# Patient Record
Sex: MALE | Race: WHITE | Employment: OTHER | URBAN - METROPOLITAN AREA
[De-identification: names, ages, dates, MRNs, and addresses within clinical notes are randomized per-mention and may not be internally consistent; named-entity substitution may affect disease eponyms.]

---

## 2017-01-09 DIAGNOSIS — E78.5 HLD (HYPERLIPIDEMIA): Primary | ICD-10-CM

## 2017-01-09 RX ORDER — ATORVASTATIN CALCIUM 40 MG/1
40 TABLET, FILM COATED ORAL DAILY
Qty: 90 TABLET | Refills: 3 | Status: SHIPPED | OUTPATIENT
Start: 2017-01-09 | End: 2018-01-15

## 2017-06-23 ENCOUNTER — HOSPITAL ENCOUNTER (EMERGENCY)
Facility: CLINIC | Age: 82
Discharge: HOME | End: 2017-06-24
Attending: EMERGENCY MEDICINE | Admitting: EMERGENCY MEDICINE
Payer: MEDICARE

## 2017-06-23 ENCOUNTER — APPOINTMENT (OUTPATIENT)
Dept: GENERAL RADIOLOGY | Facility: CLINIC | Age: 82
End: 2017-06-23
Attending: EMERGENCY MEDICINE
Payer: MEDICARE

## 2017-06-23 DIAGNOSIS — F10.920 ALCOHOL INTOXICATION, UNCOMPLICATED (H): ICD-10-CM
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
## 2017-06-23 PROCEDURE — 99285 EMERGENCY DEPT VISIT HI MDM: CPT | Mod: 25

## 2017-06-23 PROCEDURE — 71020 XR CHEST 2 VW: CPT

## 2017-06-23 NOTE — ED AVS SNAPSHOT
Emergency Department    [address] [facility] [address]    Phone:  [phone]    Fax:  [phone]                                       Du Travis   MRN: [id number]    Department:   Emergency Department   Date of Visit:  6/23/2017           Patient Information     Date Of Birth          1935        Your diagnoses for this visit were:     Alcohol intoxication, uncomplicated (H)     Accident due to mechanical fall without injury, initial encounter        You were seen by Mei Guadarrama MD.      Follow-up Information     Follow up with [facility]    Why:  As needed    Contact information:    [address] [address] [phone]          Follow up with  Emergency Department.    Specialty:  EMERGENCY MEDICINE    Why:  As needed    Contact information:    [address] [facility] [address] [phone]        Discharge Instructions         Fall Due to Dizziness, Weakness, or Loss of Balance  The symptoms that led to your fall have been evaluated. Your doctor feels it is safe for you to return home.  Many things can cause you to become dizzy. Everyone means a little something different by the word dizzy. People may describe their symptoms using these words:    It doesn t feel right in my head    It feels like spinning in my head    It seems like the room is spinning    My balance feels off    I feel lightheaded, like I am going to pass out  All these descriptions can have real causes.     Your balance mechanism is in your inner ear. Anything disturbing it can make you feel dizzy, whether it is from a cold, an injury, or many other things. Anything that causes your blood pressure to drop suddenly can make you feel lightheaded, or like you are going to faint. This is because at that moment there might not be enough blood flowing to your brain. Causes include:    Medicines    Dehydration    Standing up or bending over  too quickly    Becoming overheated    Taking a hot shower or bath    Straining hard while lifting something or using the toilet    Strokes, heart attack, heart valve disease, very slow or very fast heart rate    Low blood sugar    Ear infection    Hyperventilation    Anemia    Trauma    Infection    Panic attack    Pregnancy  You may be at risk of repeat falls. Take precautions described below to prevent another fall.  Home care    If you become lightheaded or dizzy, lie down immediately or sit and lean forward with your head down. It is better to do this than fall and seriously hurt or injure yourself.    Rest today. When changing position, take a moment to be sure any dizziness goes away before standing and walking.    If you have been prescribed a walker, be sure to use it whenever you walk, even if it is a short distance.    If you were injured during the fall, follow the advice from your doctor regarding care of your injury.    Be sure your doctor knows all the medicines, herbs, and supplements you take. Some medicines can cause dizziness.  Follow-up care  Unless given other advice, call your doctor on the next office day to advise of your fall and to schedule an appointment. You may require further treatment for the underlying condition that caused today s fall.  If X-ray or a CT scan were done, you will be notified of the results, especially if it affects treatment.  Call 911  Call 911 if any of these occur:    Trouble breathing    Confused or difficulty arousing    Fainting or loss of consciousness    Rapid or very slow heart rate    Seizure    Difficulty with speech or vision, weakness of an arm or leg    Difficulty walking or talking, loss of balance    Numbness or weakness in one side of your body, facial droop  When to seek medical advice  Call your healthcare provider right away if any of the following occur:    Another fall    Continued dizzy spells    Severe headache    Blood in vomit or stools (black  or red color)  Date Last Reviewed: 11/5/2015 2000-2017 [employer]. [address] [place] [address], [place] [address]. All rights reserved. This information is not intended as a substitute for professional medical care. Always follow your healthcare professional's instructions.          24 Hour Appointment Hotline       To make an appointment at any [facility], call [phone] ([phone]). If you don't have a family doctor or clinic, we will help you find one. [facility] clinics are conveniently located to serve the needs of you and your family.             Review of your medicines      Our records show that you are taking the medicines listed below. If these are incorrect, please call your family doctor or clinic.        Dose / Directions Last dose taken    allopurinol 300 MG tablet   Commonly known as:  ZYLOPRIM   Quantity:  90 tablet        TAKE 1 TABLET BY MOUTH DAILY   Refills:  3        ASPIRIN PO   Dose:  81 mg        Take 81 mg by mouth daily.   Refills:  0        atorvastatin 40 MG tablet   Commonly known as:  LIPITOR   Dose:  40 mg   Quantity:  90 tablet        Take 1 tablet (40 mg) by mouth daily   Refills:  3        CENTRUM SILVER per tablet   Dose:  1 tablet        Take 1 tablet by mouth daily.   Refills:  0        fish oil-omega-3 fatty acids 1000 MG capsule   Dose:  2 g        Take 2 g by mouth daily   Refills:  0        lisinopril 20 MG tablet   Commonly known as:  PRINIVIL/ZESTRIL   Dose:  20 mg   Quantity:  1 tablet        Take 1 tablet (20 mg) by mouth daily   Refills:  0        metoprolol 50 MG 24 hr tablet   Commonly known as:  TOPROL-XL   Dose:  50 mg   Quantity:  90 tablet        Take 1 tablet (50 mg) by mouth daily   Refills:  3        NAPROXEN PO   Dose:  220 mg        Take 220 mg by mouth 440 mg in the morning and 220 mg in the evening   Refills:  0        PANTOPRAZOLE SODIUM PO   Dose:  40 mg        Take 40 mg by mouth every morning (before breakfast).   Refills:  0                 Procedures and tests performed during your visit     CT Cervical Spine w/o Contrast    CT Head w/o Contrast    XR Chest 2 Views      Orders Needing Specimen Collection     None      Pending Results     Date and Time Order Name Status Description    6/23/2017 2334 CT Cervical Spine w/o Contrast Preliminary     6/23/2017 2306 XR Chest 2 Views Preliminary             Pending Culture Results     No orders found for last 3 day(s).            Pending Results Instructions     If you had any lab results that were not finalized at the time of your Discharge, you can call the ED Lab Result RN at [phone]. You will be contacted by this team for any positive Lab results or changes in treatment. The nurses are available 7 days a week from 10A to 6:30P.  You can leave a message 24 hours per day and they will return your call.        Test Results From Your Hospital Stay        6/24/2017 12:26 AM      Narrative     XR CHEST 2 VW  6/24/2017 12:21 AM      HISTORY: Chest pain, shortness of breath.     COMPARISON: 12/15/2012.    FINDINGS: The heart size is normal. The thoracic aorta is calcified  and tortuous. The lungs are clear. No pneumothorax or pleural  effusion. Degenerative disease in the spine.        Impression     IMPRESSION: No acute abnormality.         6/24/2017 12:21 AM      Narrative     CT HEAD W/O CONTRAST 6/24/2017 12:13 AM      HISTORY: Trauma.    TECHNIQUE: Routine noncontrast head CT. Radiation dose for this scan  was reduced using automated exposure control, adjustment of the mA  and/or kV according to patient size, or iterative reconstruction  technique.    COMPARISON: None.    FINDINGS: There is generalized brain atrophy. No mass, mass effect or  intracranial hemorrhage. No evidence of acute infarct.   Periventricular low attenuation is consistent with chronic small  vessel ischemic disease. The visualized paranasal sinuses are clear.        Impression     IMPRESSION:  No acute  abnormality.    SEDRICK MARTINEZ MD         6/24/2017 12:24 AM      Narrative     CT CERVICAL SPINE W/O CONTRAST  6/24/2017 12:14 AM      HISTORY: Pain after trauma.    TECHNIQUE: Multiplanar imaging of the cervical spine without  intravenous contrast. Radiation dose for this scan was reduced using  automated exposure control, adjustment of the mA and/or kV according  to patient size, or iterative reconstruction technique.     COMPARISON: None.    FINDINGS: There is no acute fracture or traumatic malalignment. There  is extensive degenerative disc and facet disease. Disc disease is  worst at C5-6. There is mild spinal stenosis at C5-6 and C6-7. The  paraspinal soft tissues are unremarkable aside from atherosclerotic  disease. The lung apices are unremarkable.        Impression     IMPRESSION: No acute traumatic abnormality. Extensive degenerative  disease.                Clinical Quality Measure: Blood Pressure Screening     Your blood pressure was checked while you were in the emergency department today. The last reading we obtained was  BP: 113/65 . Please read the guidelines below about what these numbers mean and what you should do about them.  If your systolic blood pressure (the top number) is less than 120 and your diastolic blood pressure (the bottom number) is less than 80, then your blood pressure is normal. There is nothing more that you need to do about it.  If your systolic blood pressure (the top number) is 120-139 or your diastolic blood pressure (the bottom number) is 80-89, your blood pressure may be higher than it should be. You should have your blood pressure rechecked within a year by a primary care provider.  If your systolic blood pressure (the top number) is 140 or greater or your diastolic blood pressure (the bottom number) is 90 or greater, you may have high blood pressure. High blood pressure is treatable, but if left untreated over time it can put you at risk for heart attack, stroke, or  "kidney failure. You should have your blood pressure rechecked by a primary care provider within the next 4 weeks.  If your provider in the emergency department today gave you specific instructions to follow-up with your doctor or provider even sooner than that, you should follow that instruction and not wait for up to 4 weeks for your follow-up visit.        Thank you for choosing [facility]       Thank you for choosing [facility] for your care. Our goal is always to provide you with excellent care. Hearing back from our patients is one way we can continue to improve our services. Please take a few minutes to complete the written survey that you may receive in the mail after you visit with us. Thank you!        [employer] Information     [employer] lets you send messages to your doctor, view your test results, renew your prescriptions, schedule appointments and more. To sign up, go to [email or web address] . Click on \"Log in\" on the left side of the screen, which will take you to the Welcome page. Then click on \"Sign up Now\" on the right side of the page.     You will be asked to enter the access code listed below, as well as some personal information. Please follow the directions to create your username and password.     Your access code is: [email or web address]  Expires: 2017 12:54 AM     Your access code will  in 90 days. If you need help or a new code, please call your [facility] clinic or [phone].        Care EveryWhere ID     This is your Care EveryWhere ID. This could be used by other organizations to access your [facility] medical records  [id number]        Equal Access to Services     [facility] Hospital[facility]AL[facility] AH: Hadii roney lyno Somameali, waaxda luqadaha, qaybta kaalmada adeegyacatalino, renee robertson. So [facility] [phone].    ATENCIÓN: Si habla español, tiene a rucker disposición servicios gratuitos de asistencia lingüística. Llame al [phone].    We comply with applicable federal civil rights " laws and [place] laws. We do not discriminate on the basis of race, color, national origin, age, disability sex, sexual orientation or gender identity.            After Visit Summary       This is your record. Keep this with you and show to your community pharmacist(s) and doctor(s) at your next visit.

## 2017-06-23 NOTE — ED AVS SNAPSHOT
Emergency Department    [address] [place] [address]    Phone:  [phone]    Fax:  [phone]                                       Du Travis   MRN: [id number]    Department:   Emergency Department   Date of Visit:  6/23/2017           After Visit Summary Signature Page     I have received my discharge instructions, and my questions have been answered. I have discussed any challenges I see with this plan with the nurse or doctor.    ..........................................................................................................................................  Patient/Patient Representative Signature      ..........................................................................................................................................  Patient Representative Print Name and Relationship to Patient    ..................................................               ................................................  Date                                            Time    ..........................................................................................................................................  Reviewed by Signature/Title    ...................................................              ..............................................  Date                                                            Time

## 2017-06-24 ENCOUNTER — APPOINTMENT (OUTPATIENT)
Dept: CT IMAGING | Facility: CLINIC | Age: 82
End: 2017-06-24
Attending: EMERGENCY MEDICINE
Payer: MEDICARE

## 2017-06-24 VITALS
SYSTOLIC BLOOD PRESSURE: 113 MMHG | DIASTOLIC BLOOD PRESSURE: 65 MMHG | TEMPERATURE: 97.8 F | BODY MASS INDEX: 31.66 KG/M2 | HEIGHT: 76 IN | WEIGHT: 260 LBS | OXYGEN SATURATION: 96 % | RESPIRATION RATE: 18 BRPM

## 2017-06-24 PROCEDURE — 72125 CT NECK SPINE W/O DYE: CPT

## 2017-06-24 PROCEDURE — 70450 CT HEAD/BRAIN W/O DYE: CPT

## 2017-06-24 ASSESSMENT — ENCOUNTER SYMPTOMS
NECK STIFFNESS: 1
HEADACHES: 0
MYALGIAS: 0
ARTHRALGIAS: 0

## 2017-06-24 NOTE — DISCHARGE INSTRUCTIONS
Fall Due to Dizziness, Weakness, or Loss of Balance  The symptoms that led to your fall have been evaluated. Your doctor feels it is safe for you to return home.  Many things can cause you to become dizzy. Everyone means a little something different by the word dizzy. People may describe their symptoms using these words:    It doesn t feel right in my head    It feels like spinning in my head    It seems like the room is spinning    My balance feels off    I feel lightheaded, like I am going to pass out  All these descriptions can have real causes.     Your balance mechanism is in your inner ear. Anything disturbing it can make you feel dizzy, whether it is from a cold, an injury, or many other things. Anything that causes your blood pressure to drop suddenly can make you feel lightheaded, or like you are going to faint. This is because at that moment there might not be enough blood flowing to your brain. Causes include:    Medicines    Dehydration    Standing up or bending over too quickly    Becoming overheated    Taking a hot shower or bath    Straining hard while lifting something or using the toilet    Strokes, heart attack, heart valve disease, very slow or very fast heart rate    Low blood sugar    Ear infection    Hyperventilation    Anemia    Trauma    Infection    Panic attack    Pregnancy  You may be at risk of repeat falls. Take precautions described below to prevent another fall.  Home care    If you become lightheaded or dizzy, lie down immediately or sit and lean forward with your head down. It is better to do this than fall and seriously hurt or injure yourself.    Rest today. When changing position, take a moment to be sure any dizziness goes away before standing and walking.    If you have been prescribed a walker, be sure to use it whenever you walk, even if it is a short distance.    If you were injured during the fall, follow the advice from your doctor regarding care of your injury.    Be  sure your doctor knows all the medicines, herbs, and supplements you take. Some medicines can cause dizziness.  Follow-up care  Unless given other advice, call your doctor on the next office day to advise of your fall and to schedule an appointment. You may require further treatment for the underlying condition that caused today s fall.  If X-ray or a CT scan were done, you will be notified of the results, especially if it affects treatment.  Call 911  Call 911 if any of these occur:    Trouble breathing    Confused or difficulty arousing    Fainting or loss of consciousness    Rapid or very slow heart rate    Seizure    Difficulty with speech or vision, weakness of an arm or leg    Difficulty walking or talking, loss of balance    Numbness or weakness in one side of your body, facial droop  When to seek medical advice  Call your healthcare provider right away if any of the following occur:    Another fall    Continued dizzy spells    Severe headache    Blood in vomit or stools (black or red color)  Date Last Reviewed: 11/5/2015 2000-2017 [employer]. [address] [place] [address], [place] [address]. All rights reserved. This information is not intended as a substitute for professional medical care. Always follow your healthcare professional's instructions.

## 2017-06-24 NOTE — ED NOTES
Fell backwards from 2 stairs, intoxicated, drank multiple scotch drinks, denies pain, has c-collar on, cms intact

## 2017-06-24 NOTE — ED NOTES
Bed: ED17  Expected date:   Expected time:   Means of arrival:   Comments:  E1 81M fell intoxicated

## 2017-06-24 NOTE — ED PROVIDER NOTES
"  History     Chief Complaint:  Fall and Alcohol Intoxication    HPI   Du Travis is a 81 year old male who presents to the emergency department today with his wife and son for evaluation of a fall. The patient reports he was drinking alcohol this evening while planning his 82th birthday party, when he was walking upstairs and subsequently fell. He then ended up on the bottom of the steps face down. His family estimates he fell down anywhere from two to ten steps. After the fall, he called EMS to bring him to the emergency department for further evaluation of head trauma. On exam, patient is wearing a C-collar, and repeatedly says he has no pain. The patient has no other concerns at this time.      Allergies:  Niacin      Medications:    atorvastatin (LIPITOR) 40 MG tablet  metoprolol (TOPROL-XL) 50 MG 24 hr tablet  lisinopril (PRINIVIL,ZESTRIL) 20 MG tablet  PANTOPRAZOLE SODIUM PO  ASPIRIN PO  allopurinol (ZYLOPRIM) 300 MG tablet    Past Medical History:    Arthritis   Atrial fibrillation    CAD (coronary artery disease)   Erosive gastritis   Gastro-oesophageal reflux disease   Hypertension   Other and unspecified hyperlipidemia   Snores   Syncope     Past Surgical History:    Biopsy   Cholecystectomy   Colonoscopy   Coronary angiography adult order   Orthopedic surgery   Phacoemulsification clear cornea with standard intraocular lens implant x2    Family History:    Cancer     Social History:  The patient was accompanied to the ED by his wife and son.  Smoking Status: Former smoker   Smokeless Tobacco: Never used   Alcohol Use: Yes, daily  Marital Status:       Review of Systems   Musculoskeletal: Positive for neck stiffness. Negative for arthralgias and myalgias.   Neurological: Negative for syncope and headaches.   All other systems reviewed and are negative.    Physical Exam   First Vitals:  BP: (!) 131/94  Heart Rate: 104  Temp: 97.8  F (36.6  C)  Resp: 18  Height: 193 cm (6' 4\")  Weight: " 117.9 kg (260 lb)  SpO2: 93 %      Physical Exam  General/Appearance: appears stated age, well-groomed, appears comfortable. Odor of EtOH with mild slurred speech, in C-collar.   Head: no visible or palpable bony stepoffs or crepitance.   Eyes: EOMI, PERRL, no scleral injection, no icterus  ENT: MMM, OP clear and w/o erythema/edema/exudate  Neck: supple, nl ROM, no stiffness. No posterior tenderness to palpation.   Cardiovascular: RRR, nl S1S2, no m/r/g, 2+ pulses in all 4 extremities, cap refill <2sec  Respiratory: CTAB, good air movement throughout, no wheezes/rhonchi/rales, no increased WOB, no retractions  Back: no lesions, no CVA ttp  GI: abd soft, non-distended, nttp,  no HSM, no rebound, no guarding, nl BS  MSK: GARCIA, good tone, no bony abnormality  Skin: warm and well-perfused, no rash, no edema, no ecchymosis, nl turgor  Neuro: GCS 15, alert and oriented, no gross focal neuro deficits. Cranial nerves 2-12 in tact, strength 5/5 to all four extremities. Normal sensation to all four extremities.   Psych: interacts appropriately  Heme: no petechia, no purpura, no active bleeding  Lymph: No cervical LAD    Emergency Department Course     Imaging:  Radiology findings were communicated with the patient and family who voiced understanding of the findings.    XR Chest 2 Views:  IMPRESSION: No acute abnormality.   Report per radiology      CT Cervical Spine w/o Contrast:  IMPRESSION: No acute traumatic abnormality. Extensive degenerative  Disease.  Report per radiology      CT Head w/o Contrast:  IMPRESSION:  No acute abnormality.  Report per radiology     Emergency Department Course:  Nursing notes and vitals reviewed.  I performed an exam of the patient as documented above.   0046 Patient rechecked and updated for discharge.   The patient was sent for a XR Chest 2 Views, CT Cervical Spine w/o Contrast, and CT Head w/o Contrast while in the emergency department, results above.    I discussed the treatment plan with  the patient. They expressed understanding of this plan and consented to discharge. They will be discharged home with instructions for care and follow up. In addition, the patient will return to the emergency department if their symptoms persist, worsen, if new symptoms arise or if there is any concern.  All questions were answered.   I personally reviewed the imaging results with the Patient and answered all related questions prior to discharge.    Impression & Plan      Medical Decision Making:  Du Travis is a 81 year old male who presents intoxicated with a mechanical fall. It is unclear how many stairs he fell down, ranging between two steps to a full flight. It's unsure if he hit his head however due to the nature of the fall and his age I did feel inability to clear him via nexus [facility] so imaging was necessary. Thankfully head CT and C-spine were negative. He is not on any blood thinners. He has no pain elsewhere with no other signs of trauma. At this point I feel he is safe to go home with his family.     Diagnosis:    ICD-10-CM    1. Alcohol intoxication, uncomplicated (H) F10.120    2. Accident due to mechanical fall without injury, initial encounter W19.XXXA       Disposition:   Discharged to home      Scribe Disclosure:  I, Grant Pickard, am serving as a scribe at 11:06 PM on 6/23/2017 to document services personally performed by Mei Guadarrama MD, based on my observations and the provider's statements to me.      Grant Pickard  6/23/2017    EMERGENCY DEPARTMENT       Mei Guadarrama MD  06/24/17 0640

## 2017-11-02 ENCOUNTER — OFFICE VISIT (OUTPATIENT)
Dept: CARDIOLOGY | Facility: CLINIC | Age: 82
End: 2017-11-02
Attending: INTERNAL MEDICINE
Payer: MEDICARE

## 2017-11-02 VITALS
HEIGHT: 75 IN | WEIGHT: 269 LBS | SYSTOLIC BLOOD PRESSURE: 181 MMHG | HEART RATE: 96 BPM | BODY MASS INDEX: 33.45 KG/M2 | DIASTOLIC BLOOD PRESSURE: 81 MMHG

## 2017-11-02 DIAGNOSIS — R55 SYNCOPE, UNSPECIFIED SYNCOPE TYPE: ICD-10-CM

## 2017-11-02 DIAGNOSIS — I25.10 CORONARY ARTERY DISEASE INVOLVING NATIVE CORONARY ARTERY OF NATIVE HEART WITHOUT ANGINA PECTORIS: ICD-10-CM

## 2017-11-02 DIAGNOSIS — I10 ESSENTIAL HYPERTENSION: Primary | ICD-10-CM

## 2017-11-02 PROCEDURE — 99214 OFFICE O/P EST MOD 30 MIN: CPT | Performed by: INTERNAL MEDICINE

## 2017-11-02 RX ORDER — METOPROLOL SUCCINATE 100 MG/1
100 TABLET, EXTENDED RELEASE ORAL DAILY
Qty: 90 TABLET | Refills: 3 | Status: SHIPPED | OUTPATIENT
Start: 2017-11-02 | End: 2017-12-08

## 2017-11-02 RX ORDER — MULTIVIT-MIN/IRON/FOLIC ACID/K 18-600-40
CAPSULE ORAL
COMMUNITY

## 2017-11-02 RX ORDER — HYDROCHLOROTHIAZIDE 12.5 MG/1
12.5 CAPSULE ORAL DAILY
Qty: 90 CAPSULE | Refills: 3 | Status: SHIPPED | OUTPATIENT
Start: 2017-11-02 | End: 2017-12-01 | Stop reason: SINTOL

## 2017-11-02 NOTE — PROGRESS NOTES
HPI and Plan:   See dictation    Orders Placed This Encounter   Procedures     Basic metabolic panel     Basic metabolic panel     Follow-Up with Nurse     Follow-Up with Cardiologist       Orders Placed This Encounter   Medications     Vitamin D, Cholecalciferol, 1000 UNITS TABS     metoprolol (TOPROL-XL) 100 MG 24 hr tablet     Sig: Take 1 tablet (100 mg) by mouth daily     Dispense:  90 tablet     Refill:  3     hydrochlorothiazide (MICROZIDE) 12.5 MG capsule     Sig: Take 1 capsule (12.5 mg) by mouth daily     Dispense:  90 capsule     Refill:  3       Medications Discontinued During This Encounter   Medication Reason     metoprolol (TOPROL-XL) 50 MG 24 hr tablet Reorder         Encounter Diagnoses   Name Primary?     Coronary artery disease involving native coronary artery of native heart without angina pectoris      Syncope, unspecified syncope type      Essential hypertension Yes       CURRENT MEDICATIONS:  Current Outpatient Prescriptions   Medication Sig Dispense Refill     Vitamin D, Cholecalciferol, 1000 UNITS TABS        metoprolol (TOPROL-XL) 100 MG 24 hr tablet Take 1 tablet (100 mg) by mouth daily 90 tablet 3     hydrochlorothiazide (MICROZIDE) 12.5 MG capsule Take 1 capsule (12.5 mg) by mouth daily 90 capsule 3     atorvastatin (LIPITOR) 40 MG tablet Take 1 tablet (40 mg) by mouth daily 90 tablet 3     lisinopril (PRINIVIL,ZESTRIL) 20 MG tablet Take 40 mg by mouth daily  1 tablet 0     fish oil-omega-3 fatty acids 1000 MG capsule Take 2 g by mouth daily       PANTOPRAZOLE SODIUM PO Take 40 mg by mouth every morning (before breakfast).       ASPIRIN PO Take 81 mg by mouth daily.       NAPROXEN PO Take 220 mg by mouth 440 mg in the morning and 220 mg in the evening       allopurinol (ZYLOPRIM) 300 MG tablet TAKE 1 TABLET BY MOUTH DAILY 90 tablet 3     [DISCONTINUED] metoprolol (TOPROL-XL) 50 MG 24 hr tablet Take 1 tablet (50 mg) by mouth daily 90 tablet 3     Multiple Vitamins-Minerals (CENTRUM SILVER)  per tablet Take 1 tablet by mouth daily.         ALLERGIES     Allergies   Allergen Reactions     Niacin        PAST MEDICAL HISTORY:  Past Medical History:   Diagnosis Date     Arthritis      Atrial fibrillation (H)     found on event monitor 12/26/14     CAD (coronary artery disease)      Erosive gastritis      Gastro-oesophageal reflux disease      Hypertension      Other and unspecified hyperlipidemia      Snores      Syncope October 2014    On flight to [place] October 2014       PAST SURGICAL HISTORY:  Past Surgical History:   Procedure Laterality Date     BIOPSY       CHOLECYSTECTOMY       COLONOSCOPY       CORONARY ANGIOGRAPHY ADULT ORDER  12/2012    diffuse Diag disease, Med management.     ORTHOPEDIC SURGERY      bilateral hip replaced, and right knee replaced     PHACOEMULSIFICATION CLEAR CORNEA WITH STANDARD INTRAOCULAR LENS IMPLANT  5/21/2013    Procedure: PHACOEMULSIFICATION CLEAR CORNEA WITH STANDARD INTRAOCULAR LENS IMPLANT;  RIGHT PHACOEMULSIFICATION CLEAR CORNEA WITH STANDARD INTRAOCULAR LENS IMPLANT ;  Surgeon: Diana Edward MD;  Location: [facility]     PHACOEMULSIFICATION CLEAR CORNEA WITH STANDARD INTRAOCULAR LENS IMPLANT  5/28/2013    Procedure: PHACOEMULSIFICATION CLEAR CORNEA WITH STANDARD INTRAOCULAR LENS IMPLANT;  LEFT PHACOEMULSIFICATION CLEAR CORNEA WITH STANDARD INTRAOCULAR LENS IMPLANT ;  Surgeon: Diana Edward MD;  Location: [facility]       FAMILY HISTORY:  Family History   Problem Relation Age of Onset     Other - See Comments Mother 94     natural causes     CANCER Father 59       SOCIAL HISTORY:  Social History     Social History     Marital status:      Spouse name: N/A     Number of children: N/A     Years of education: N/A     Social History Main Topics     Smoking status: Former Smoker     Types: Cigars     Smokeless tobacco: Never Used      Comment: quit 35 years ago     Alcohol use 3.5 oz/week     7 drink(s) per week      Comment: daily     Drug use: No     Sexual  "activity: Not Asked     Other Topics Concern     Caffeine Concern No     2 coffee in the morning, cafeine free cola, water-2 large     Exercise No     Seat Belt Yes     Social History Narrative       Review of Systems:  Skin:  Negative       Eyes:  Positive for glasses    ENT:  not assessed      Respiratory:  Positive for dyspnea on exertion     Cardiovascular:    Positive for;fatigue    Gastroenterology: Negative      Genitourinary:  Negative      Musculoskeletal:  Positive for joint pain    Neurologic:  Negative      Psychiatric:  Negative      Heme/Lymph/Imm:  Positive for allergies    Endocrine:  Negative        Physical Exam:  Vitals: /81  Pulse 96  Ht 1.892 m (6' 2.5\")  Wt 122 kg (269 lb)  BMI 34.08 kg/m2    Constitutional:  cooperative, alert and oriented, well developed, well nourished, in no acute distress obese      Skin:  warm and dry to the touch, no apparent skin lesions or masses noted venous stasis changes        Head:  normocephalic, no masses or lesions        Eyes:  pupils equal and round, conjunctivae and lids unremarkable, sclera white, no xanthalasma, EOMS intact, no nystagmus        Lymph:No Cervical lymphadenopathy present     ENT:  no pallor or cyanosis, dentition good        Neck:  carotid pulses are full and equal bilaterally, JVP normal, no carotid bruit        Respiratory:  normal breath sounds, clear to auscultation, normal A-P diameter, normal symmetry, normal respiratory excursion, no use of accessory muscles         Cardiac: regular rhythm, normal S1/S2, no S3 or S4, apical impulse not displaced, no murmurs, gallops or rubs   distant heart sounds            pulses full and equal, no bruits auscultated                                        GI:  abdomen soft, non-tender, BS normoactive, no mass, no HSM, no bruits obese      Extremities and Muscular Skeletal:  no deformities, clubbing, cyanosis, erythema observed;no edema         VARICOSE VEINS (Surgical scar over right " knee)    Neurological:  no gross motor deficits;affect appropriate        Psych:  Alert and Oriented x 3        CC  Curtis Stoner MD  [address]  [place] [address]

## 2017-11-02 NOTE — PROGRESS NOTES
HISTORY OF PRESENT ILLNESS:  It is my pleasure to see your patient, Du Travis, a pleasant 82-year-old gentleman with a history of coronary artery disease which is essentially small vessel coronary artery disease involving the diagonal arteries with no major flow limitation to the large epicardial vessels.  He was followed previously by my former partner, Dr. Chris Luna and medical therapy was felt to be best in his situation.  He has been doing quite well except for hypertension.  His blood pressure was 181/81  today.  On recheck his blood pressure was 170/90.  He does admit to being under a significant amount of stress.  I believe he sold a property in [place], he has bought a new property on [address] and he is having a garage sale this weekend.  His blood pressure, however, has always been borderline raised and the last 2 blood pressures were 142/82 and 140/84.  Clearly he would be better off with a lower blood pressure given that he already has established coronary artery disease, not to mention the risk of kidney dysfunction and stroke.  He does not complain of any chest pains or chest pressure.  He has no unusual shortness of breath.  He is somewhat limited in how much he can walk because of his arthritic issues.      IMPRESSION:   1.  Essential hypertension.  His blood pressure is significantly raised here today and while stress may be contributing to this, his blood pressures have been less than optimally controlled at least the measurements from last year.   2.  Coronary artery disease.  The patient is asymptomatic with respect to coronary artery disease with no symptoms of angina pectoris.      PLAN:  We will increase the dose of metoprolol to 100 mg per day.  I will start him on hydrochlorothiazide 12.5 mg per day.  In 1 weeks' time I will have the patient return for a nurse visit to recheck his blood pressure on those medications.  On that day we will also check a basic metabolic profile.  I  will see the patient myself in 6 months' time when he comes back from [place].  It has been a pleasure to be involved in the care of this very nice patient.      Sincerely,      Curtis Aparicio MD       cc:   Liam Hair MD    [address]  [address]         CURTIS APARICIO MD, [facility]             D: 2017 10:45   T: 2017 16:01   MT: CARLOS ALBERTO      Name:     KELLY COTO   MRN:      [id number]        Account:      [id number]   :      1935           Service Date: 2017      Document: [id number]

## 2017-11-02 NOTE — LETTER
11/2/2017    Lima Hair MD  [address] [address]    RE: Du Travis       Dear Colleague,    I had the pleasure of seeing Du Travis in the [facility].    It is my pleasure to see your patient, Du Travis, a pleasant 82-year-old gentleman with a history of coronary artery disease which is essentially small vessel coronary artery disease involving the diagonal arteries with no major flow limitation to the large epicardial vessels.  He was followed previously by my former partner, Dr. Chris Luna and medical therapy was felt to be best in his situation.  He has been doing quite well except for hypertension.  His blood pressure was 181/81  today.  On recheck his blood pressure was 170/90.  He does admit to being under a significant amount of stress.  I believe he sold a property in [place], he has bought a new property on [address] and he is having a garage sale this weekend.  His blood pressure, however, has always been borderline raised and the last 2 blood pressures were 142/82 and 140/84.  Clearly he would be better off with a lower blood pressure given that he already has established coronary artery disease, not to mention the risk of kidney dysfunction and stroke.  He does not complain of any chest pains or chest pressure.  He has no unusual shortness of breath.  He is somewhat limited in how much he can walk because of his arthritic issues.     Outpatient Encounter Prescriptions as of 11/2/2017   Medication Sig Dispense Refill     Vitamin D, Cholecalciferol, 1000 UNITS TABS        metoprolol (TOPROL-XL) 100 MG 24 hr tablet Take 1 tablet (100 mg) by mouth daily 90 tablet 3     hydrochlorothiazide (MICROZIDE) 12.5 MG capsule Take 1 capsule (12.5 mg) by mouth daily 90 capsule 3     atorvastatin (LIPITOR) 40 MG tablet Take 1 tablet (40 mg) by mouth daily 90 tablet 3     lisinopril (PRINIVIL,ZESTRIL)  20 MG tablet Take 40 mg by mouth daily  1 tablet 0     fish oil-omega-3 fatty acids 1000 MG capsule Take 2 g by mouth daily       PANTOPRAZOLE SODIUM PO Take 40 mg by mouth every morning (before breakfast).       ASPIRIN PO Take 81 mg by mouth daily.       NAPROXEN PO Take 220 mg by mouth 440 mg in the morning and 220 mg in the evening       allopurinol (ZYLOPRIM) 300 MG tablet TAKE 1 TABLET BY MOUTH DAILY 90 tablet 3     [DISCONTINUED] metoprolol (TOPROL-XL) 50 MG 24 hr tablet Take 1 tablet (50 mg) by mouth daily 90 tablet 3     Multiple Vitamins-Minerals (CENTRUM SILVER) per tablet Take 1 tablet by mouth daily.       No facility-administered encounter medications on file as of 11/2/2017.       IMPRESSION:   1.  Essential hypertension.  His blood pressure is significantly raised here today and while stress may be contributing to this, his blood pressures have been less than optimally controlled at least the measurements from last year.   2.  Coronary artery disease.  The patient is asymptomatic with respect to coronary artery disease with no symptoms of angina pectoris.      PLAN:  We will increase the dose of metoprolol to 100 mg per day.  I will start him on hydrochlorothiazide 12.5 mg per day.  In 1 weeks' time I will have the patient return for a nurse visit to recheck his blood pressure on those medications.  On that day we will also check a basic metabolic profile.  I will see the patient myself in 6 months' time when he comes back from [place].  It has been a pleasure to be involved in the care of this very nice patient.     Sincerely,    Curtis Stoner MD     [facility]

## 2017-11-02 NOTE — MR AVS SNAPSHOT
After Visit Summary   11/2/2017    Du Travis    MRN: [id number]           Patient Information     Date Of Birth          1935        Visit Information        Provider Department      11/2/2017 10:15 AM Curtis Stoner MD [facility]        Today's Diagnoses     Essential hypertension    -  1    Coronary artery disease involving native coronary artery of native heart without angina pectoris        Syncope, unspecified syncope type           Follow-ups after your visit        Additional Services     Follow-Up with Nurse           Follow-Up with Cardiologist                 Future tests that were ordered for you today     Open Future Orders        Priority Expected Expires Ordered    Basic metabolic panel Routine 5/1/2018 11/2/2018 11/2/2017    Follow-Up with Cardiologist Routine 5/1/2018 11/2/2018 11/2/2017    Basic metabolic panel Routine 11/9/2017 11/2/2018 11/2/2017    Follow-Up with Nurse Routine 11/9/2017 11/2/2018 11/2/2017            Who to contact     If you have questions or need follow up information about today's clinic visit or your schedule please contact [facility] directly at [phone].  Normal or non-critical lab and imaging results will be communicated to you by [employer], letter or phone within 4 business days after the clinic has received the results. If you do not hear from us within 7 days, please contact the clinic through [employer] or phone. If you have a critical or abnormal lab result, we will notify you by phone as soon as possible.  Submit refill requests through [employer] or call your pharmacy and they will forward the refill request to us. Please allow 3 business days for your refill to be completed.          Additional Information About Your Visit        MyChart Information     [employer] lets you send messages to your doctor, view your test results, renew your prescriptions,  "schedule appointments and more. To sign up, go to [email or web address] . Click on \"Log in\" on the left side of the screen, which will take you to the Welcome page. Then click on \"Sign up Now\" on the right side of the page.     You will be asked to enter the access code listed below, as well as some personal information. Please follow the directions to create your username and password.     Your access code is: [email or web address]  Expires: 2018 10:48 AM     Your access code will  in 90 days. If you need help or a new code, please call your [facility] clinic or [phone].        Care EveryWhere ID     This is your Care EveryWhere ID. This could be used by other organizations to access your [facility] medical records  [id number]        Your Vitals Were     Pulse Height BMI (Body Mass Index)             96 1.892 m (6' 2.5\") 34.08 kg/m2          Blood Pressure from Last 3 Encounters:   17 181/81   17 113/65   16 142/82    Weight from Last 3 Encounters:   17 122 kg (269 lb)   17 117.9 kg (260 lb)   16 123.8 kg (273 lb)              We Performed the Following     Follow-Up with Cardiologist          Today's Medication Changes          These changes are accurate as of: 17 10:48 AM.  If you have any questions, ask your nurse or doctor.               Start taking these medicines.        Dose/Directions    hydrochlorothiazide 12.5 MG capsule   Commonly known as:  MICROZIDE   Used for:  Essential hypertension   Started by:  Curtis Stoner MD        Dose:  12.5 mg   Take 1 capsule (12.5 mg) by mouth daily   Quantity:  90 capsule   Refills:  3         These medicines have changed or have updated prescriptions.        Dose/Directions    metoprolol 100 MG 24 hr tablet   Commonly known as:  TOPROL-XL   This may have changed:    - medication strength  - how much to take   Used for:  Coronary artery disease involving native coronary artery of native heart without angina " pectoris   Changed by:  Curtis Stoner MD        Dose:  100 mg   Take 1 tablet (100 mg) by mouth daily   Quantity:  90 tablet   Refills:  3            Where to get your medicines      These medications were sent to [facility] - [place], [place] - [address] [place] [address] [facility]  [address] [place] [address] [place] [address]    Hours:  24-hours Phone:  [phone]     hydrochlorothiazide 12.5 MG capsule    metoprolol 100 MG 24 hr tablet                Primary Care Provider Office Phone # Fax #    Liam Hair [phone]       [address] [address]        Equal Access to Services     [facility]BET [facility] : Hadii roney rojas hadasho Somameali, waaxda luqadaha, qaybta kaalmada adeegyada, renee bruno . So [facility] [phone].    ATENCIÓN: Si habla español, tiene a rucker disposición servicios gratuitos de asistencia lingüística. [facility] [phone].    We comply with applicable federal civil rights laws and [place] laws. We do not discriminate on the basis of race, color, national origin, age, disability, sex, sexual orientation, or gender identity.            Thank you!     Thank you for choosing [facility]  for your care. Our goal is always to provide you with excellent care. Hearing back from our patients is one way we can continue to improve our services. Please take a few minutes to complete the written survey that you may receive in the mail after your visit with us. Thank you!             Your Updated Medication List - Protect others around you: Learn how to safely use, store and throw away your medicines at www.disposemymeds.org.          This list is accurate as of: 11/2/17 10:48 AM.  Always use your most recent med list.                   Brand Name Dispense Instructions for use Diagnosis    allopurinol 300 MG tablet    ZYLOPRIM    90 tablet    TAKE 1 TABLET BY MOUTH  DAILY    Gout       ASPIRIN PO      Take 81 mg by mouth daily.        atorvastatin 40 MG tablet    LIPITOR    90 tablet    Take 1 tablet (40 mg) by mouth daily    HLD (hyperlipidemia)       CENTRUM SILVER per tablet      Take 1 tablet by mouth daily.        fish oil-omega-3 fatty acids 1000 MG capsule      Take 2 g by mouth daily        hydrochlorothiazide 12.5 MG capsule    MICROZIDE    90 capsule    Take 1 capsule (12.5 mg) by mouth daily    Essential hypertension       lisinopril 20 MG tablet    PRINIVIL/ZESTRIL    1 tablet    Take 40 mg by mouth daily        metoprolol 100 MG 24 hr tablet    TOPROL-XL    90 tablet    Take 1 tablet (100 mg) by mouth daily    Coronary artery disease involving native coronary artery of native heart without angina pectoris       NAPROXEN PO      Take 220 mg by mouth 440 mg in the morning and 220 mg in the evening        PANTOPRAZOLE SODIUM PO      Take 40 mg by mouth every morning (before breakfast).        Vitamin D (Cholecalciferol) 1000 UNITS Tabs

## 2017-11-09 ENCOUNTER — CARE COORDINATION (OUTPATIENT)
Dept: CARDIOLOGY | Facility: CLINIC | Age: 82
End: 2017-11-09

## 2017-11-09 ENCOUNTER — ALLIED HEALTH/NURSE VISIT (OUTPATIENT)
Dept: CARDIOLOGY | Facility: CLINIC | Age: 82
End: 2017-11-09
Attending: INTERNAL MEDICINE

## 2017-11-09 VITALS — SYSTOLIC BLOOD PRESSURE: 159 MMHG | DIASTOLIC BLOOD PRESSURE: 88 MMHG | HEART RATE: 102 BPM

## 2017-11-09 DIAGNOSIS — I10 ESSENTIAL HYPERTENSION: ICD-10-CM

## 2017-11-09 LAB
ANION GAP SERPL CALCULATED.3IONS-SCNC: 14.1 MMOL/L (ref 6–17)
BUN SERPL-MCNC: 21 MG/DL (ref 7–30)
CALCIUM SERPL-MCNC: 10 MG/DL (ref 8.5–10.5)
CHLORIDE SERPL-SCNC: 101 MMOL/L (ref 98–107)
CO2 SERPL-SCNC: 28 MMOL/L (ref 23–29)
CREAT SERPL-MCNC: 1.44 MG/DL (ref 0.7–1.3)
GFR SERPL CREATININE-BSD FRML MDRD: 47 ML/MIN/1.7M2
GLUCOSE SERPL-MCNC: 145 MG/DL (ref 70–105)
POTASSIUM SERPL-SCNC: 4.1 MMOL/L (ref 3.5–5.1)
SODIUM SERPL-SCNC: 139 MMOL/L (ref 136–145)

## 2017-11-09 PROCEDURE — 36415 COLL VENOUS BLD VENIPUNCTURE: CPT | Performed by: INTERNAL MEDICINE

## 2017-11-09 PROCEDURE — 80048 BASIC METABOLIC PNL TOTAL CA: CPT | Performed by: INTERNAL MEDICINE

## 2017-11-09 NOTE — PROGRESS NOTES
"Reviewed BMP showing   Recent Labs   Lab Test  11/09/17   0946 05/15/13  12/15/12   1322   NA  139  141  139   POTASSIUM  4.1  4.5  3.5   CHLORIDE  101  108*  105   CO2  28   --   20   ANIONGAP  14.1  13  15   GLC  145*  90  116*   BUN  21  19  18   CR  1.44*  1.33*  1.19   SHAD  10.0  9.3  9.2     BP check showed /88 Pulse 102    Per office visit dated 11/2/17, Dr. Stoner recommended, \"We will increase the dose of metoprolol to 100 mg per day.  I will start him on hydrochlorothiazide 12.5 mg per day.  In 1 weeks' time I will have the patient return for a nurse visit to recheck his blood pressure on those medications.  On that day we will also check a basic metabolic profile.\"  Called pt with results. He is taking meds as prescribed & did take meds this AM. Pt stats he does watch sodium in diet a little but states he could do better.  Pt does take Aleve 400 mg daily in the AM and 200 mg daily in the PM; advised to cut back to take only as needed as use as sparingly as possible. Pt & wife advised to try to take Tylenol for pain if possible. Pt & wife verbalized understanding. LPenfield RN   "

## 2017-11-10 NOTE — PROGRESS NOTES
Would add amlodipine 5 mg per day and recheck BP and BMP in 2 weeks. Would cut back on NSAID use as this could be affecting kidney function.

## 2017-11-16 DIAGNOSIS — I10 BENIGN ESSENTIAL HYPERTENSION: Primary | ICD-10-CM

## 2017-11-16 RX ORDER — AMLODIPINE BESYLATE 5 MG/1
5 TABLET ORAL DAILY
Qty: 90 TABLET | Refills: 3 | Status: SHIPPED | OUTPATIENT
Start: 2017-11-16 | End: 2018-11-19

## 2017-11-16 NOTE — PROGRESS NOTES
Attempted to call pt with recommendations from Dr. Stoner, left message for pt to call back. LPenfield RN

## 2017-11-16 NOTE — PROGRESS NOTES
Call back received from patient.  Reviewed recommendations from Dr. CUMMINGS for addition of Amlodipine 5mg daily and a BP check with BMP in 2 weeks time.  Pt agreeable to plan.  Pt call transferred to scheduling to make appointments.  Orders entered for BP check and BMP.  Amlodipine eScripted to patients pharmacy of choice.

## 2017-11-30 ENCOUNTER — TELEPHONE (OUTPATIENT)
Dept: CARDIOLOGY | Facility: CLINIC | Age: 82
End: 2017-11-30

## 2017-11-30 ENCOUNTER — ALLIED HEALTH/NURSE VISIT (OUTPATIENT)
Dept: CARDIOLOGY | Facility: CLINIC | Age: 82
End: 2017-11-30

## 2017-11-30 VITALS — DIASTOLIC BLOOD PRESSURE: 60 MMHG | SYSTOLIC BLOOD PRESSURE: 100 MMHG | HEART RATE: 98 BPM

## 2017-11-30 DIAGNOSIS — I10 ESSENTIAL HYPERTENSION: Primary | ICD-10-CM

## 2017-11-30 DIAGNOSIS — I10 BENIGN ESSENTIAL HYPERTENSION: ICD-10-CM

## 2017-11-30 LAB
ANION GAP SERPL CALCULATED.3IONS-SCNC: 15.2 MMOL/L (ref 6–17)
BUN SERPL-MCNC: 20 MG/DL (ref 7–30)
CALCIUM SERPL-MCNC: 9.2 MG/DL (ref 8.5–10.5)
CHLORIDE SERPL-SCNC: 100 MMOL/L (ref 98–107)
CO2 SERPL-SCNC: 23 MMOL/L (ref 23–29)
CREAT SERPL-MCNC: 1.86 MG/DL (ref 0.7–1.3)
GFR SERPL CREATININE-BSD FRML MDRD: 35 ML/MIN/1.7M2
GLUCOSE SERPL-MCNC: 131 MG/DL (ref 70–105)
POTASSIUM SERPL-SCNC: 4.2 MMOL/L (ref 3.5–5.1)
SODIUM SERPL-SCNC: 134 MMOL/L (ref 136–145)

## 2017-11-30 PROCEDURE — 36415 COLL VENOUS BLD VENIPUNCTURE: CPT | Performed by: INTERNAL MEDICINE

## 2017-11-30 PROCEDURE — 80048 BASIC METABOLIC PNL TOTAL CA: CPT | Performed by: INTERNAL MEDICINE

## 2017-11-30 NOTE — TELEPHONE ENCOUNTER
"Reviewed BMP showing   Recent Labs   Lab Test  11/30/17   1414  11/09/17   0946   NA  134*  139   POTASSIUM  4.2  4.1   CHLORIDE  100  101   CO2  23  28   ANIONGAP  15.2  14.1   GLC  131*  145*   BUN  20  21   CR  1.86*  1.44*   SHAD  9.2  10.0   BP checked today was /60 w/ Pulse 98  Per office note dated 11/2/17, Dr. Stoner recommended, \"We will increase the dose of metoprolol to 100 mg per day.  I will start him on hydrochlorothiazide 12.5 mg per day.  In 1 weeks' time I will have the patient return for a nurse visit to recheck his blood pressure on those medications.  On that day we will also check a basic metabolic profile.  I will see the patient myself in 6 months' time when he comes back from [place].  It has been a pleasure to be involved in the care of this very nice patient.\" Will message Dr. Stoner to review. LPenfield RN     Received message from MA: \"Blood pressure checked per . Pt says he has been off of pain medication for 10 days which may have lowered bp readings today. He feels he needs to resume a pain med and would like advice on what to take.Thanks\" Pt advised to call Dr. Hair re: what her should be taking for pain.   "

## 2017-11-30 NOTE — MR AVS SNAPSHOT
"              After Visit Summary   11/30/2017    Du Travis    MRN: [id number]           Patient Information     Date Of Birth          1935        Visit Information        Provider Department      11/30/2017 3:00 PM [facility]        Today's Diagnoses     Benign essential hypertension           Follow-ups after your visit        Your next 10 appointments already scheduled     Nov 30, 2017  3:00 PM CST   Nurse Only with [facility]   [facility] ([facility])    [address] [place], [place] [id number]  [facility] [address] [phone]              Who to contact     If you have questions or need follow up information about today's clinic visit or your schedule please contact [facility] directly at [phone].  Normal or non-critical lab and imaging results will be communicated to you by PaySimplehart, letter or phone within 4 business days after the clinic has received the results. If you do not hear from us within 7 days, please contact the clinic through PaySimplehart or phone. If you have a critical or abnormal lab result, we will notify you by phone as soon as possible.  Submit refill requests through Zecco or call your pharmacy and they will forward the refill request to us. Please allow 3 business days for your refill to be completed.          Additional Information About Your Visit        PaySimplehart Information     Zecco lets you send messages to your doctor, view your test results, renew your prescriptions, schedule appointments and more. To sign up, go to [email or web address] . Click on \"Log in\" on the left side of the screen, which will take you to the Welcome page. Then click on \"Sign up Now\" on the right side of the page.     You will be asked to enter the access code listed below, as well as some personal information. Please follow the " directions to create your username and password.     Your access code is: [email or web address]  Expires: 2018  9:48 AM     Your access code will  in 90 days. If you need help or a new code, please call your [place] clinic or [phone].        Care EveryWhere ID     This is your Care EveryWhere ID. This could be used by other organizations to access your [place] medical records  [id number]        Your Vitals Were     Pulse                   98            Blood Pressure from Last 3 Encounters:   17 100/60   17 159/88   17 181/81    Weight from Last 3 Encounters:   17 122 kg (269 lb)   17 117.9 kg (260 lb)   16 123.8 kg (273 lb)              We Performed the Following     Follow-Up with Nurse        Primary Care Provider Office Phone # Fax #    Liam Hair [phone]       [place] [address]        Equal Access to Services     [facility]: Hadii aad ku hadasho Soomaali, waaxda luqadaha, qaybta kaalmada adeegyada, waxay idiin hayaan yulissa bruno . So [facility] [phone].    ATENCIÓN: Si habla español, tiene a rucker disposición servicios gratuitos de asistencia lingüística. Llame al [phone].    We comply with applicable federal civil rights laws and [place] laws. We do not discriminate on the basis of race, color, national origin, age, disability, sex, sexual orientation, or gender identity.            Thank you!     Thank you for choosing [facility]  for your care. Our goal is always to provide you with excellent care. Hearing back from our patients is one way we can continue to improve our services. Please take a few minutes to complete the written survey that you may receive in the mail after your visit with us. Thank you!             Your Updated Medication List - Protect others around you: Learn how to safely use, store and throw away your medicines at  www.disposemymeds.org.          This list is accurate as of: 11/30/17  2:35 PM.  Always use your most recent med list.                   Brand Name Dispense Instructions for use Diagnosis    allopurinol 300 MG tablet    ZYLOPRIM    90 tablet    TAKE 1 TABLET BY MOUTH DAILY    Gout       amLODIPine 5 MG tablet    NORVASC    90 tablet    Take 1 tablet (5 mg) by mouth daily    Benign essential hypertension       ASPIRIN PO      Take 81 mg by mouth daily.        atorvastatin 40 MG tablet    LIPITOR    90 tablet    Take 1 tablet (40 mg) by mouth daily    HLD (hyperlipidemia)       CENTRUM SILVER per tablet      Take 1 tablet by mouth daily.        fish oil-omega-3 fatty acids 1000 MG capsule      Take 2 g by mouth daily        hydrochlorothiazide 12.5 MG capsule    MICROZIDE    90 capsule    Take 1 capsule (12.5 mg) by mouth daily    Essential hypertension       lisinopril 20 MG tablet    PRINIVIL/ZESTRIL    1 tablet    Take 40 mg by mouth daily        metoprolol 100 MG 24 hr tablet    TOPROL-XL    90 tablet    Take 1 tablet (100 mg) by mouth daily    Coronary artery disease involving native coronary artery of native heart without angina pectoris       NAPROXEN PO      Take 220 mg by mouth 440 mg in the morning and 220 mg in the evening        PANTOPRAZOLE SODIUM PO      Take 40 mg by mouth every morning (before breakfast).        Vitamin D (Cholecalciferol) 1000 UNITS Tabs

## 2017-12-01 NOTE — TELEPHONE ENCOUNTER
Called pt & wife with recommendations from Dr. Stoner to stop HCTZ & repeat BMP & BP check in one week. Pt & wife verbalized understanding. Orders in chart & will message scheduling to call pt ot arrange. LPenfield RN

## 2017-12-08 ENCOUNTER — ALLIED HEALTH/NURSE VISIT (OUTPATIENT)
Dept: CARDIOLOGY | Facility: CLINIC | Age: 82
End: 2017-12-08
Attending: INTERNAL MEDICINE
Payer: MEDICARE

## 2017-12-08 VITALS — SYSTOLIC BLOOD PRESSURE: 120 MMHG | HEART RATE: 88 BPM | DIASTOLIC BLOOD PRESSURE: 60 MMHG

## 2017-12-08 DIAGNOSIS — I10 ESSENTIAL HYPERTENSION: ICD-10-CM

## 2017-12-08 DIAGNOSIS — I25.10 CORONARY ARTERY DISEASE INVOLVING NATIVE CORONARY ARTERY OF NATIVE HEART WITHOUT ANGINA PECTORIS: ICD-10-CM

## 2017-12-08 DIAGNOSIS — I10 ESSENTIAL HYPERTENSION: Primary | ICD-10-CM

## 2017-12-08 LAB
ANION GAP SERPL CALCULATED.3IONS-SCNC: 14.8 MMOL/L (ref 6–17)
BUN SERPL-MCNC: 24 MG/DL (ref 7–30)
CALCIUM SERPL-MCNC: 9.1 MG/DL (ref 8.5–10.5)
CHLORIDE SERPL-SCNC: 103 MMOL/L (ref 98–107)
CO2 SERPL-SCNC: 23 MMOL/L (ref 23–29)
CREAT SERPL-MCNC: 1.89 MG/DL (ref 0.7–1.3)
GFR SERPL CREATININE-BSD FRML MDRD: 34 ML/MIN/1.7M2
GLUCOSE SERPL-MCNC: 100 MG/DL (ref 70–105)
POTASSIUM SERPL-SCNC: 4.8 MMOL/L (ref 3.5–5.1)
SODIUM SERPL-SCNC: 136 MMOL/L (ref 136–145)

## 2017-12-08 PROCEDURE — 36415 COLL VENOUS BLD VENIPUNCTURE: CPT | Performed by: INTERNAL MEDICINE

## 2017-12-08 PROCEDURE — 80048 BASIC METABOLIC PNL TOTAL CA: CPT | Performed by: INTERNAL MEDICINE

## 2017-12-08 PROCEDURE — 99207 ZZC NO CHARGE LOS: CPT

## 2017-12-08 RX ORDER — METOPROLOL SUCCINATE 100 MG/1
100 TABLET, EXTENDED RELEASE ORAL DAILY
Qty: 90 TABLET | Refills: 3 | Status: SHIPPED | OUTPATIENT
Start: 2017-12-08 | End: 2018-11-01

## 2017-12-08 NOTE — TELEPHONE ENCOUNTER
Reviewed BMP showing   Recent Labs   Lab Test  12/08/17   1444  11/30/17   1414   NA  136  134*   POTASSIUM  4.8  4.2   CHLORIDE  103  100   CO2  23  23   ANIONGAP  14.8  15.2   GLC  100  131*   BUN  24  20   CR  1.89*  1.86*   SHAD  9.1  9.2     BP was 120/60 w/ HR of 88 bpm. Will let Dr. Stoner know.

## 2017-12-08 NOTE — TELEPHONE ENCOUNTER
Called pt with recommendations from Dr. Stoner. Pt states he does not drink much water; advised to get 8 glasses of 8 oz each in daily and recheck BMP in one week. Dr. Stoner also recommended pt should see PMD if creat continues to be elevated. He was taking Aleve but stopped that 10 days ago and is in more pain because of it. Pt advised to take it occasionally just not 3 tabs every day. Pt scheduled for recheck 12/15/17. LPenfield RN

## 2017-12-08 NOTE — MR AVS SNAPSHOT
"              After Visit Summary   2017    Du Travis    MRN: [id number]           Patient Information     Date Of Birth          1935        Visit Information        Provider Department      2017 3:00 PM [facility]        Today's Diagnoses     Essential hypertension    -  1       Follow-ups after your visit        Who to contact     If you have questions or need follow up information about today's clinic visit or your schedule please contact [facility] directly at [phone].  Normal or non-critical lab and imaging results will be communicated to you by Zipzoomhart, letter or phone within 4 business days after the clinic has received the results. If you do not hear from us within 7 days, please contact the clinic through Soundtrackert or phone. If you have a critical or abnormal lab result, we will notify you by phone as soon as possible.  Submit refill requests through Pop[employer] Games or call your pharmacy and they will forward the refill request to us. Please allow 3 business days for your refill to be completed.          Additional Information About Your Visit        MyChart Information     Pop[employer] Games lets you send messages to your doctor, view your test results, renew your prescriptions, schedule appointments and more. To sign up, go to [email or web address] . Click on \"Log in\" on the left side of the screen, which will take you to the Welcome page. Then click on \"Sign up Now\" on the right side of the page.     You will be asked to enter the access code listed below, as well as some personal information. Please follow the directions to create your username and password.     Your access code is: [email or web address]  Expires: 2018  9:48 AM     Your access code will  in 90 days. If you need help or a new code, please call your [place] clinic or [phone].        Care EveryWhere ID     This is " your Care EveryWhere ID. This could be used by other organizations to access your [place] medical records  [id number]        Your Vitals Were     Pulse                   88            Blood Pressure from Last 3 Encounters:   12/08/17 120/60   11/30/17 100/60   11/09/17 159/88    Weight from Last 3 Encounters:   11/02/17 122 kg (269 lb)   06/23/17 117.9 kg (260 lb)   09/09/16 123.8 kg (273 lb)              Today, you had the following     No orders found for display         Where to get your medicines      These medications were sent to [id number]  [place], [place] - [address] [facility] [address]  [address], [place] [address]     Phone:  [phone]     metoprolol 100 MG 24 hr tablet          Primary Care Provider Office Phone # Fax #    Liam Hair [phone]       [address] [address]        Equal Access to Services     [facility]: Hadii aad ku hadasho Soomaali, waaxda luqadaha, qaybta kaalmada adeegyada, waxay idiin hayyasir bruno . So [facility] [phone].    ATENCIÓN: Si habla español, tiene a rucker disposición servicios gratuitos de asistencia lingüística. Llame al [phone].    We comply with applicable federal civil rights laws and [place] laws. We do not discriminate on the basis of race, color, national origin, age, disability, sex, sexual orientation, or gender identity.            Thank you!     Thank you for choosing [facility]  for your care. Our goal is always to provide you with excellent care. Hearing back from our patients is one way we can continue to improve our services. Please take a few minutes to complete the written survey that you may receive in the mail after your visit with us. Thank you!             Your Updated Medication List - Protect others around you: Learn how to safely use, store and throw away your medicines at  www.disposemymeds.org.          This list is accurate as of: 12/8/17  3:14 PM.  Always use your most recent med list.                   Brand Name Dispense Instructions for use Diagnosis    allopurinol 300 MG tablet    ZYLOPRIM    90 tablet    TAKE 1 TABLET BY MOUTH DAILY    Gout       amLODIPine 5 MG tablet    NORVASC    90 tablet    Take 1 tablet (5 mg) by mouth daily    Benign essential hypertension       ASPIRIN PO      Take 81 mg by mouth daily.        atorvastatin 40 MG tablet    LIPITOR    90 tablet    Take 1 tablet (40 mg) by mouth daily    HLD (hyperlipidemia)       CENTRUM SILVER per tablet      Take 1 tablet by mouth daily.        fish oil-omega-3 fatty acids 1000 MG capsule      Take 2 g by mouth daily        lisinopril 20 MG tablet    PRINIVIL/ZESTRIL    1 tablet    Take 40 mg by mouth daily        metoprolol 100 MG 24 hr tablet    TOPROL-XL    90 tablet    Take 1 tablet (100 mg) by mouth daily    Coronary artery disease involving native coronary artery of native heart without angina pectoris       NAPROXEN PO      Take 220 mg by mouth 440 mg in the morning and 220 mg in the evening        PANTOPRAZOLE SODIUM PO      Take 40 mg by mouth every morning (before breakfast).        Vitamin D (Cholecalciferol) 1000 UNITS Tabs

## 2017-12-08 NOTE — ADDENDUM NOTE
Addended by: PENFIELD, LYNETTE EILEEN BARTELT on: 12/8/2017 04:57 PM     Modules accepted: Orders

## 2017-12-08 NOTE — TELEPHONE ENCOUNTER
I would recheck BMP in 1 week. Would inquire whether he is drinking enough fluids and if he is taking NSAIDS. Jean Claude

## 2017-12-15 DIAGNOSIS — I10 ESSENTIAL HYPERTENSION: ICD-10-CM

## 2017-12-15 LAB
ANION GAP SERPL CALCULATED.3IONS-SCNC: 15.1 MMOL/L (ref 6–17)
BUN SERPL-MCNC: 20 MG/DL (ref 7–30)
CALCIUM SERPL-MCNC: 9.3 MG/DL (ref 8.5–10.5)
CHLORIDE SERPL-SCNC: 99 MMOL/L (ref 98–107)
CO2 SERPL-SCNC: 22 MMOL/L (ref 23–29)
CREAT SERPL-MCNC: 1.46 MG/DL (ref 0.7–1.3)
GFR SERPL CREATININE-BSD FRML MDRD: 46 ML/MIN/1.7M2
GLUCOSE SERPL-MCNC: 82 MG/DL (ref 70–105)
POTASSIUM SERPL-SCNC: 4.1 MMOL/L (ref 3.5–5.1)
SODIUM SERPL-SCNC: 132 MMOL/L (ref 136–145)

## 2017-12-15 PROCEDURE — 80048 BASIC METABOLIC PNL TOTAL CA: CPT | Performed by: INTERNAL MEDICINE

## 2017-12-15 PROCEDURE — 36415 COLL VENOUS BLD VENIPUNCTURE: CPT | Performed by: INTERNAL MEDICINE

## 2017-12-18 NOTE — TELEPHONE ENCOUNTER
Reviewed BMP showing   Recent Labs   Lab Test  12/15/17   0854  12/08/17   1444   NA  132*  136   POTASSIUM  4.1  4.8   CHLORIDE  99  103   CO2  22*  23   ANIONGAP  15.1  14.8   GLC  82  100   BUN  20  24   CR  1.46*  1.89*   SHAD  9.3  9.1     BP check was 120/60 w/ HR of 88 bpm. Called pt, he has been drinking 8 glasses of water daily, advised to cut back a little as he might be washing out his sodium. Will message Dr. Stoner to review. LPenfield RN

## 2017-12-20 ENCOUNTER — DOCUMENTATION ONLY (OUTPATIENT)
Dept: CARDIOLOGY | Facility: CLINIC | Age: 82
End: 2017-12-20

## 2018-01-10 ENCOUNTER — HOSPITAL ENCOUNTER (EMERGENCY)
Facility: CLINIC | Age: 83
Discharge: HOME | End: 2018-01-11
Attending: EMERGENCY MEDICINE | Admitting: EMERGENCY MEDICINE
Payer: MEDICARE

## 2018-01-10 DIAGNOSIS — F10.920 ALCOHOLIC INTOXICATION WITHOUT COMPLICATION (H): ICD-10-CM

## 2018-01-10 DIAGNOSIS — R41.82 ALTERED MENTAL STATUS, UNSPECIFIED ALTERED MENTAL STATUS TYPE: ICD-10-CM

## 2018-01-10 DIAGNOSIS — E87.6 HYPOKALEMIA: ICD-10-CM

## 2018-01-10 PROCEDURE — 80329 ANALGESICS NON-OPIOID 1 OR 2: CPT | Performed by: EMERGENCY MEDICINE

## 2018-01-10 PROCEDURE — 80320 DRUG SCREEN QUANTALCOHOLS: CPT | Performed by: EMERGENCY MEDICINE

## 2018-01-10 PROCEDURE — 80053 COMPREHEN METABOLIC PANEL: CPT | Performed by: EMERGENCY MEDICINE

## 2018-01-10 PROCEDURE — 85025 COMPLETE CBC W/AUTO DIFF WBC: CPT | Performed by: EMERGENCY MEDICINE

## 2018-01-10 PROCEDURE — 93005 ELECTROCARDIOGRAM TRACING: CPT

## 2018-01-10 PROCEDURE — 99284 EMERGENCY DEPT VISIT MOD MDM: CPT

## 2018-01-10 PROCEDURE — 80329 ANALGESICS NON-OPIOID 1 OR 2: CPT | Mod: 91 | Performed by: EMERGENCY MEDICINE

## 2018-01-10 NOTE — ED AVS SNAPSHOT
Emergency Department    [address] [place] [address]    Phone:  [phone]    Fax:  [phone]                                       Du Travis   MRN: [id number]    Department:   Emergency Department   Date of Visit:  1/10/2018           After Visit Summary Signature Page     I have received my discharge instructions, and my questions have been answered. I have discussed any challenges I see with this plan with the nurse or doctor.    ..........................................................................................................................................  Patient/Patient Representative Signature      ..........................................................................................................................................  Patient Representative Print Name and Relationship to Patient    ..................................................               ................................................  Date                                            Time    ..........................................................................................................................................  Reviewed by Signature/Title    ...................................................              ..............................................  Date                                                            Time

## 2018-01-10 NOTE — ED AVS SNAPSHOT
Emergency Department    [address] [facility] [address]    Phone:  [phone]    Fax:  [phone]                                       Du Travis   MRN: [id number]    Department:   Emergency Department   Date of Visit:  1/10/2018           Patient Information     Date Of Birth          1935        Your diagnoses for this visit were:     Altered mental status, unspecified altered mental status type     Alcoholic intoxication without complication (H)     Hypokalemia        You were seen by Lang Antoine MD.      Follow-up Information     Follow up with Liam Hair MD In 1 day.    Specialty:  Internal Medicine    Contact information:    [address] [address] [phone]          Discharge Instructions         Altered Level of Consciousness  Level of consciousness (LOC) is a measure of a person s ability to interact with other people and to react to the surroundings. A person with an altered level of consciousness may not respond to touch or voices. He or she may look vacant or blank and may not make eye contact with others. He or she may be limp and may not move for a long time or show little interest in moving. He or she may also be confused.  There are many causes of altered LOC. They include low blood sugar, infection, medicines, injuries, or other medical problems.  Altered LOC is a medical emergency. The healthcare provider will do tests to help find the cause. These may include blood tests and imaging tests. The person is treated so breathing and heart rate are stable. An intravenous (IV) line may be put into a vein in the arm or hand to give medicines. Once the cause of altered LOC is found, the goal is to treat the cause.  In almost all cases, the person will be admitted to the hospital for observation.  Home care  When your loved one is released from the hospital, you will be given guidelines for caring for him  or her. In general:    Follow the healthcare provider's instructions for giving any prescribed medicines to your child.    Stay with your loved one or have another responsible adult look after him or her. Watch carefully for any return of symptoms or changes in behavior.    If the person has diabetes, make sure that any approved medicines are given on time and as prescribed.  Follow-up care  Follow up with the healthcare provider or our staff.  When to seek medical advice  Call the healthcare provider right away for any of the following:    Symptoms of altered LOC return    New symptoms appear  Date Last Reviewed: 8/23/2015 2000-2017 [employer]. [address] [place] [address]. All rights reserved. This information is not intended as a substitute for professional medical care. Always follow your healthcare professional's instructions.          Alcohol Intoxication  Alcohol intoxication occurs when you drink alcohol faster than your liver can remove it from your system. The following facts are important to remember:    It can take 10 minutes or more to start to feel the effects of a drink, so you can easily get more intoxicated than you intended.    One drink may be more than 1 serving of alcohol. Depending on the drink, it can be 2 to 4 servings.    It takes about an hour for your body to metabolize (clear) 1 serving. If you have more than 1 drink, it can take a couple of hours or more.    Many things affect how drinks will affect you, including whether you ve eaten, how fast you drink, your size, how much you normally drink (or not), medicines you take, chronic diseases you have, and gender.  Signs and symptoms of alcohol poisoning  The following are signs and symptoms of alcohol poisoning:  Mild impairment    Reduced inhibitions    Slurred speech    Drowsiness    Decreased fine motor skills  Moderate impairment    Erratic behavior, aggression, depression    Impaired  "judgment    Confusion    Concentration difficulties    Coordination problems  Severe impairment    Vomiting    Seizures    Unconsciousness    Cold, clammy    Slow or irregular breathing    Hypothermia (low body temperature)    Coma  Health effects  Alcohol abuse causes health problems. Sometimes this can happen after only drinking a  little.\" There is no set number of drinks or amount of alcohol that defines too much. The more you drink at one time, and the more frequently you drink determine both the short-term and long-term health effects. It affects all parts of your body and your health, including your:    Brain. Alcohol is a central nervous system depressant. It can damage parts of the brain that affect your balance, memory, thinking, and emotions. It can cause memory loss, blackouts, depression, agitation, sleep cycle changes, and seizures. These changes may or may not be reversible.    Heart and vascular system. Alcohol affects multiple areas. It can damage heart muscle causing cardiomyopathy, which is a weakening and stretching of the heart muscle. This can lead to trouble breathing, an irregular heartbeat, atrial fibrillation, leg swelling, and heart failure. It makes the blood vessels stiffen causing hypertension (high blood pressure). All of these problems increase your risk of having heart attacks or strokes.    Liver. Alcohol causes fat to build up in the liver, affecting its normal function. This increases the risk for hepatitis, leading to abdominal pain, appetite loss, jaundice, bleeding problems, liver fibrosis, and cirrhosis. This in turn can affect your ability to fight off infections, and can cause diabetes. The liver changes prevent it from removing toxins in your blood that can cause encephalopathy. Signs of this are confusion, altered level of consciousness, personality changes, memory loss, seizures, coma, and death.    Pancreas. Alcohol can cause inflammation of the pancreas, or " pancreatitis. This can cause pain in your abdomen, fever, and diabetes.    Immune system. Alcohol weakens your immune system in a number of ways. It suppresses your immune system making it harder to fight off infections and colds. You will also have a higher risk of certain infections like pneumonia and tuberculosis.    Cancer risk. Alcohol raises your risk of cancer of the mouth, esophagus, pharynx, larynx, liver, and breast.    Sexual function. Alcohol abuse can also lead to sexual problems.  Alcohol use during pregnancy may cause permanent damage to the growing baby.  Home care  The following guidelines will help you care for yourself at home:    Don't drink any more alcohol.    Don't drive until all effects of the alcohol have worn off.    Don't operate machinery that can cause injuries.    Get lots of rest over the next few days. Drink plenty of water and other non-alcoholic liquids. Try to eat regular meals.    If you have been drinking heavily on a daily basis, you may go through alcohol withdrawal. The usual symptoms last 3 to 4 days and may include nervousness, shakiness, nausea, sweating, sleeplessness, and can even cause seizures and a serious withdrawal symptom called delirium tremens, or DTs. During this time, it is best that you stay with family or friends who can help and support you. You can also admit yourself to a residential detox program. If your symptoms are severe (seizures, severe shakiness, confusion), contact your doctor or call an ambulance for help (see below).   Follow-up care  If alcohol is a problem in your life, these are some organizations that can help you:    Alcoholics Anonymous offers support through a self-help fellowship. There are no dues or fees. See the Yellow Pages and call for time and place of meetings. Find AA online at www.aa.org.    [employer] offers support to families of alcohol users. Contact 665-379-1076, or online at www.al-anon.org.    National [employer] on Alcoholism  and Drug Dependence can be reached at 242-331-6422, or online at www.[employer].org.    There are also inpatient and residential alcohol detox programs. Check the Internet or phonebook Yellow Pages under  Drug Abuse and Treatment Centers.   Call 911  Call 911 if any of these occur:    Trouble breathing or slow irregular breathing    Chest pain    Sudden weakness on one side of your body or sudden trouble speaking    Heavy bleeding or vomiting blood    Very drowsy or trouble awakening    Fainting or loss of consciousness    Rapid heart rate    Seizure  When to seek medical advice  Call your healthcare provider right away if any of these occur:    Severe shakiness     Fever over 100.4  F (38.0  C)    Confusion or hallucinations (seeing, hearing, or feeling things that are not there)    Pain in your upper abdomen that gets worse    Repeated vomiting  Date Last Reviewed: 6/1/2016 2000-2017 [employer]. [address] [place] [address]. All rights reserved. This information is not intended as a substitute for professional medical care. Always follow your healthcare professional's instructions.          Hypokalemia  Hypokalemia means a low level of potassium in the blood. This most often occurs in people who take diuretics (water pills). It can also occur due to severe vomiting or diarrhea.  It is also seen in people who take laxatives for long periods of time. It can sometimes be associated with hypomagnesemia (low magnesium) which needs to be corrected before your potassium levels can be fixed.  A mild case usually causes no symptoms. It is only found with blood testing. More severe potassium loss causes generalized weakness, muscle or abdominal cramping, heart palpitations (rapid or irregular heartbeats), low blood pressure, specific muscle weakness, and in some people who are paralyzed.   Home care    Take any potassium supplements prescribed.    Eat foods rich in potassium. The highest amount is  found in avocado, baked potatoes, spinach, cantaloupe, cod, halibut, salmon, and scallops. White, red, or patrick beans are also very good sources. A modest amount is found in orange juice, bananas, carrots, and tomato juice.    If you take certain types of diuretics, you will also need to take potassium supplements. If you take a diuretic, discuss potassium supplements with your doctor.  Follow-up care  Follow up with your healthcare provider for a repeat blood test within the next week or as advised by our staff.  When to seek medical advice  Call your healthcare provider if any of the following occur:    Increased weakness, fatigue, muscle cramps    Dizziness  Call 911  Call 911 if any of the following occur:    Irregular heartbeat, extra beats or very fast heart rate    Loss of consciousness  Date Last Reviewed: 7/1/2017 2000-2017 [employer]. [address] [place] [address]. All rights reserved. This information is not intended as a substitute for professional medical care. Always follow your healthcare professional's instructions.          24 Hour Appointment Hotline       To make an appointment at any [facility], call [phone] ([phone]). If you don't have a family doctor or clinic, we will help you find one. [facility] are conveniently located to serve the needs of you and your family.             Review of your medicines      Our records show that you are taking the medicines listed below. If these are incorrect, please call your family doctor or clinic.        Dose / Directions Last dose taken    allopurinol 300 MG tablet   Commonly known as:  ZYLOPRIM   Quantity:  90 tablet        TAKE 1 TABLET BY MOUTH DAILY   Refills:  3        amLODIPine 5 MG tablet   Commonly known as:  NORVASC   Dose:  5 mg   Quantity:  90 tablet        Take 1 tablet (5 mg) by mouth daily   Refills:  3        ASPIRIN PO   Dose:  81 mg        Take 81 mg by mouth daily.   Refills:  0         atorvastatin 40 MG tablet   Commonly known as:  LIPITOR   Dose:  40 mg   Quantity:  90 tablet        Take 1 tablet (40 mg) by mouth daily   Refills:  3        CENTRUM SILVER per tablet   Dose:  1 tablet        Take 1 tablet by mouth daily.   Refills:  0        fish oil-omega-3 fatty acids 1000 MG capsule   Dose:  2 g        Take 2 g by mouth daily   Refills:  0        lisinopril 20 MG tablet   Commonly known as:  PRINIVIL/ZESTRIL   Dose:  40 mg   Quantity:  1 tablet        Take 40 mg by mouth daily   Refills:  0        metoprolol 100 MG 24 hr tablet   Commonly known as:  TOPROL-XL   Dose:  100 mg   Quantity:  90 tablet        Take 1 tablet (100 mg) by mouth daily   Refills:  3        NAPROXEN PO   Dose:  220 mg        Take 220 mg by mouth 440 mg in the morning and 220 mg in the evening   Refills:  0        PANTOPRAZOLE SODIUM PO   Dose:  40 mg        Take 40 mg by mouth every morning (before breakfast).   Refills:  0        Vitamin D (Cholecalciferol) 1000 UNITS Tabs        Refills:  0                Procedures and tests performed during your visit     Acetaminophen level    Alcohol level blood    CBC with platelets + differential    Comprehensive metabolic panel    Salicylate level      Orders Needing Specimen Collection     None      Pending Results     No orders found for last 3 day(s).            Pending Culture Results     No orders found for last 3 day(s).            Pending Results Instructions     If you had any lab results that were not finalized at the time of your Discharge, you can call the ED Lab Result RN at [phone]. You will be contacted by this team for any positive Lab results or changes in treatment. The nurses are available 7 days a week from 10A to 6:30P.  You can leave a message 24 hours per day and they will return your call.        Test Results From Your Hospital Stay        1/11/2018  1:13 AM      Component Results     Component Value Ref Range & Units Status    Ethanol g/dL 0.19 (H)  <0.01 g/dL Final         1/11/2018  1:15 AM      Component Results     Component Value Ref Range & Units Status    Acetaminophen Level <2 mg/L Final    Therapeutic range: 10-20 mg/L         1/11/2018  1:15 AM      Component Results     Component Value Ref Range & Units Status    Salicylate Level <2 mg/dL Final    Therapeutic:        <20  Anti inflammatory:  15-30           1/11/2018  1:16 AM      Component Results     Component Value Ref Range & Units Status    Sodium 140 133 - 144 mmol/L Final    Potassium 3.3 (L) 3.4 - 5.3 mmol/L Final    Chloride 107 94 - 109 mmol/L Final    Carbon Dioxide 24 20 - 32 mmol/L Final    Anion Gap 9 3 - 14 mmol/L Final    Glucose 89 70 - 99 mg/dL Final    Urea Nitrogen 25 7 - 30 mg/dL Final    Creatinine 1.18 0.66 - 1.25 mg/dL Final    GFR Estimate 59 (L) >60 mL/min/1.7m2 Final    Non  GFR Calc    GFR Estimate If Black 72 >60 mL/min/1.7m2 Final    African American GFR Calc    Calcium 8.4 (L) 8.5 - 10.1 mg/dL Final    Bilirubin Total 0.1 (L) 0.2 - 1.3 mg/dL Final    Albumin 2.7 (L) 3.4 - 5.0 g/dL Final    Protein Total 6.4 (L) 6.8 - 8.8 g/dL Final    Alkaline Phosphatase 81 40 - 150 U/L Final    ALT 39 0 - 70 U/L Final    AST 38 0 - 45 U/L Final         1/11/2018 12:53 AM      Component Results     Component Value Ref Range & Units Status    WBC 9.9 4.0 - 11.0 10e9/L Final    RBC Count 3.65 (L) 4.4 - 5.9 10e12/L Final    Hemoglobin 12.6 (L) 13.3 - 17.7 g/dL Final    Hematocrit 36.6 (L) 40.0 - 53.0 % Final     78 - 100 fl Final    MCH 34.5 (H) 26.5 - 33.0 pg Final    MCHC 34.4 31.5 - 36.5 g/dL Final    RDW 14.2 10.0 - 15.0 % Final    Platelet Count 398 150 - 450 10e9/L Final    Diff Method Automated Method  Final    % Neutrophils 34.6 % Final    % Lymphocytes 47.3 % Final    % Monocytes 11.0 % Final    % Eosinophils 4.1 % Final    % Basophils 0.5 % Final    % Immature Granulocytes 2.5 % Final    Nucleated RBCs 0 0 /100 Final    Absolute Neutrophil 3.4 1.6 - 8.3  10e9/L Final    Absolute Lymphocytes 4.7 0.8 - 5.3 10e9/L Final    Absolute Monocytes 1.1 0.0 - 1.3 10e9/L Final    Absolute Eosinophils 0.4 0.0 - 0.7 10e9/L Final    Absolute Basophils 0.1 0.0 - 0.2 10e9/L Final    Abs Immature Granulocytes 0.3 0 - 0.4 10e9/L Final    Absolute Nucleated RBC 0.0  Final                Clinical Quality Measure: Blood Pressure Screening     Your blood pressure was checked while you were in the emergency department today. The last reading we obtained was  BP: 142/73 . Please read the guidelines below about what these numbers mean and what you should do about them.  If your systolic blood pressure (the top number) is less than 120 and your diastolic blood pressure (the bottom number) is less than 80, then your blood pressure is normal. There is nothing more that you need to do about it.  If your systolic blood pressure (the top number) is 120-139 or your diastolic blood pressure (the bottom number) is 80-89, your blood pressure may be higher than it should be. You should have your blood pressure rechecked within a year by a primary care provider.  If your systolic blood pressure (the top number) is 140 or greater or your diastolic blood pressure (the bottom number) is 90 or greater, you may have high blood pressure. High blood pressure is treatable, but if left untreated over time it can put you at risk for heart attack, stroke, or kidney failure. You should have your blood pressure rechecked by a primary care provider within the next 4 weeks.  If your provider in the emergency department today gave you specific instructions to follow-up with your doctor or provider even sooner than that, you should follow that instruction and not wait for up to 4 weeks for your follow-up visit.        Thank you for choosing [facility]       Thank you for choosing [facility] for your care. Our goal is always to provide you with excellent care. Hearing back from our patients is one way we can continue to  "improve our services. Please take a few minutes to complete the written survey that you may receive in the mail after you visit with us. Thank you!        [employer] Information     [employer] lets you send messages to your doctor, view your test results, renew your prescriptions, schedule appointments and more. To sign up, go to [email or web address] . Click on \"Log in\" on the left side of the screen, which will take you to the Welcome page. Then click on \"Sign up Now\" on the right side of the page.     You will be asked to enter the access code listed below, as well as some personal information. Please follow the directions to create your username and password.     Your access code is: [email or web address]  Expires: 2018  9:48 AM     Your access code will  in 90 days. If you need help or a new code, please call your [facility] clinic or [phone].        Care EveryWhere ID     This is your Care EveryWhere ID. This could be used by other organizations to access your [facility] medical records  [id number]        Equal Access to Services     [facility] : Hadii roney Alvarez, wajordin fischer, qaybsimon jarvisalrajan preciado, renee bruno . So [facility] [phone].    ATENCIÓN: Si habla español, tiene a rucker disposición servicios gratuitos de asistencia lingüística. Nicky al [phone].    We comply with applicable federal civil rights laws and [place] laws. We do not discriminate on the basis of race, color, national origin, age, disability, sex, sexual orientation, or gender identity.            After Visit Summary       This is your record. Keep this with you and show to your community pharmacist(s) and doctor(s) at your next visit.                  "

## 2018-01-11 VITALS
WEIGHT: 270 LBS | OXYGEN SATURATION: 93 % | DIASTOLIC BLOOD PRESSURE: 73 MMHG | HEART RATE: 89 BPM | HEIGHT: 75 IN | RESPIRATION RATE: 13 BRPM | TEMPERATURE: 98.3 F | SYSTOLIC BLOOD PRESSURE: 142 MMHG | BODY MASS INDEX: 33.57 KG/M2

## 2018-01-11 LAB
ALBUMIN SERPL-MCNC: 2.7 G/DL (ref 3.4–5)
ALP SERPL-CCNC: 81 U/L (ref 40–150)
ALT SERPL W P-5'-P-CCNC: 39 U/L (ref 0–70)
ANION GAP SERPL CALCULATED.3IONS-SCNC: 9 MMOL/L (ref 3–14)
APAP SERPL-MCNC: <2 MG/L (ref 10–20)
AST SERPL W P-5'-P-CCNC: 38 U/L (ref 0–45)
BASOPHILS # BLD AUTO: 0.1 10E9/L (ref 0–0.2)
BASOPHILS NFR BLD AUTO: 0.5 %
BILIRUB SERPL-MCNC: 0.1 MG/DL (ref 0.2–1.3)
BUN SERPL-MCNC: 25 MG/DL (ref 7–30)
CALCIUM SERPL-MCNC: 8.4 MG/DL (ref 8.5–10.1)
CHLORIDE SERPL-SCNC: 107 MMOL/L (ref 94–109)
CO2 SERPL-SCNC: 24 MMOL/L (ref 20–32)
CREAT SERPL-MCNC: 1.18 MG/DL (ref 0.66–1.25)
DIFFERENTIAL METHOD BLD: ABNORMAL
EOSINOPHIL # BLD AUTO: 0.4 10E9/L (ref 0–0.7)
EOSINOPHIL NFR BLD AUTO: 4.1 %
ERYTHROCYTE [DISTWIDTH] IN BLOOD BY AUTOMATED COUNT: 14.2 % (ref 10–15)
ETHANOL SERPL-MCNC: 0.19 G/DL
GFR SERPL CREATININE-BSD FRML MDRD: 59 ML/MIN/1.7M2
GLUCOSE SERPL-MCNC: 89 MG/DL (ref 70–99)
HCT VFR BLD AUTO: 36.6 % (ref 40–53)
HGB BLD-MCNC: 12.6 G/DL (ref 13.3–17.7)
IMM GRANULOCYTES # BLD: 0.3 10E9/L (ref 0–0.4)
IMM GRANULOCYTES NFR BLD: 2.5 %
LYMPHOCYTES # BLD AUTO: 4.7 10E9/L (ref 0.8–5.3)
LYMPHOCYTES NFR BLD AUTO: 47.3 %
MCH RBC QN AUTO: 34.5 PG (ref 26.5–33)
MCHC RBC AUTO-ENTMCNC: 34.4 G/DL (ref 31.5–36.5)
MCV RBC AUTO: 100 FL (ref 78–100)
MONOCYTES # BLD AUTO: 1.1 10E9/L (ref 0–1.3)
MONOCYTES NFR BLD AUTO: 11 %
NEUTROPHILS # BLD AUTO: 3.4 10E9/L (ref 1.6–8.3)
NEUTROPHILS NFR BLD AUTO: 34.6 %
NRBC # BLD AUTO: 0 10*3/UL
NRBC BLD AUTO-RTO: 0 /100
PLATELET # BLD AUTO: 398 10E9/L (ref 150–450)
POTASSIUM SERPL-SCNC: 3.3 MMOL/L (ref 3.4–5.3)
PROT SERPL-MCNC: 6.4 G/DL (ref 6.8–8.8)
RBC # BLD AUTO: 3.65 10E12/L (ref 4.4–5.9)
SALICYLATES SERPL-MCNC: <2 MG/DL
SODIUM SERPL-SCNC: 140 MMOL/L (ref 133–144)
WBC # BLD AUTO: 9.9 10E9/L (ref 4–11)

## 2018-01-11 PROCEDURE — A9270 NON-COVERED ITEM OR SERVICE: HCPCS | Mod: GY | Performed by: EMERGENCY MEDICINE

## 2018-01-11 PROCEDURE — 25000132 ZZH RX MED GY IP 250 OP 250 PS 637: Mod: GY | Performed by: EMERGENCY MEDICINE

## 2018-01-11 RX ORDER — POTASSIUM CHLORIDE 1.5 G/1.58G
40 POWDER, FOR SOLUTION ORAL ONCE
Status: COMPLETED | OUTPATIENT
Start: 2018-01-11 | End: 2018-01-11

## 2018-01-11 RX ADMIN — POTASSIUM CHLORIDE 40 MEQ: 1.5 POWDER, FOR SOLUTION ORAL at 01:32

## 2018-01-11 NOTE — ED NOTES
Bed: ED16  Expected date: 1/10/18  Expected time: 11:34 PM  Means of arrival: Ambulance  Comments:  Lashanda M2 82M intoxicated

## 2018-01-11 NOTE — DISCHARGE INSTRUCTIONS
Altered Level of Consciousness  Level of consciousness (LOC) is a measure of a person s ability to interact with other people and to react to the surroundings. A person with an altered level of consciousness may not respond to touch or voices. He or she may look vacant or blank and may not make eye contact with others. He or she may be limp and may not move for a long time or show little interest in moving. He or she may also be confused.  There are many causes of altered LOC. They include low blood sugar, infection, medicines, injuries, or other medical problems.  Altered LOC is a medical emergency. The healthcare provider will do tests to help find the cause. These may include blood tests and imaging tests. The person is treated so breathing and heart rate are stable. An intravenous (IV) line may be put into a vein in the arm or hand to give medicines. Once the cause of altered LOC is found, the goal is to treat the cause.  In almost all cases, the person will be admitted to the hospital for observation.  Home care  When your loved one is released from the hospital, you will be given guidelines for caring for him or her. In general:    Follow the healthcare provider's instructions for giving any prescribed medicines to your child.    Stay with your loved one or have another responsible adult look after him or her. Watch carefully for any return of symptoms or changes in behavior.    If the person has diabetes, make sure that any approved medicines are given on time and as prescribed.  Follow-up care  Follow up with the healthcare provider or our staff.  When to seek medical advice  Call the healthcare provider right away for any of the following:    Symptoms of altered LOC return    New symptoms appear  Date Last Reviewed: 8/23/2015 2000-2017 [employer]. [address] [place] [address], [place] [address]. All rights reserved. This information is not intended as a substitute for professional medical  "care. Always follow your healthcare professional's instructions.          Alcohol Intoxication  Alcohol intoxication occurs when you drink alcohol faster than your liver can remove it from your system. The following facts are important to remember:    It can take 10 minutes or more to start to feel the effects of a drink, so you can easily get more intoxicated than you intended.    One drink may be more than 1 serving of alcohol. Depending on the drink, it can be 2 to 4 servings.    It takes about an hour for your body to metabolize (clear) 1 serving. If you have more than 1 drink, it can take a couple of hours or more.    Many things affect how drinks will affect you, including whether you ve eaten, how fast you drink, your size, how much you normally drink (or not), medicines you take, chronic diseases you have, and gender.  Signs and symptoms of alcohol poisoning  The following are signs and symptoms of alcohol poisoning:  Mild impairment    Reduced inhibitions    Slurred speech    Drowsiness    Decreased fine motor skills  Moderate impairment    Erratic behavior, aggression, depression    Impaired judgment    Confusion    Concentration difficulties    Coordination problems  Severe impairment    Vomiting    Seizures    Unconsciousness    Cold, clammy    Slow or irregular breathing    Hypothermia (low body temperature)    Coma  Health effects  Alcohol abuse causes health problems. Sometimes this can happen after only drinking a  little.\" There is no set number of drinks or amount of alcohol that defines too much. The more you drink at one time, and the more frequently you drink determine both the short-term and long-term health effects. It affects all parts of your body and your health, including your:    Brain. Alcohol is a central nervous system depressant. It can damage parts of the brain that affect your balance, memory, thinking, and emotions. It can cause memory loss, blackouts, depression, agitation, sleep " cycle changes, and seizures. These changes may or may not be reversible.    Heart and vascular system. Alcohol affects multiple areas. It can damage heart muscle causing cardiomyopathy, which is a weakening and stretching of the heart muscle. This can lead to trouble breathing, an irregular heartbeat, atrial fibrillation, leg swelling, and heart failure. It makes the blood vessels stiffen causing hypertension (high blood pressure). All of these problems increase your risk of having heart attacks or strokes.    Liver. Alcohol causes fat to build up in the liver, affecting its normal function. This increases the risk for hepatitis, leading to abdominal pain, appetite loss, jaundice, bleeding problems, liver fibrosis, and cirrhosis. This in turn can affect your ability to fight off infections, and can cause diabetes. The liver changes prevent it from removing toxins in your blood that can cause encephalopathy. Signs of this are confusion, altered level of consciousness, personality changes, memory loss, seizures, coma, and death.    Pancreas. Alcohol can cause inflammation of the pancreas, or pancreatitis. This can cause pain in your abdomen, fever, and diabetes.    Immune system. Alcohol weakens your immune system in a number of ways. It suppresses your immune system making it harder to fight off infections and colds. You will also have a higher risk of certain infections like pneumonia and tuberculosis.    Cancer risk. Alcohol raises your risk of cancer of the mouth, esophagus, pharynx, larynx, liver, and breast.    Sexual function. Alcohol abuse can also lead to sexual problems.  Alcohol use during pregnancy may cause permanent damage to the growing baby.  Home care  The following guidelines will help you care for yourself at home:    Don't drink any more alcohol.    Don't drive until all effects of the alcohol have worn off.    Don't operate machinery that can cause injuries.    Get lots of rest over the next few  days. Drink plenty of water and other non-alcoholic liquids. Try to eat regular meals.    If you have been drinking heavily on a daily basis, you may go through alcohol withdrawal. The usual symptoms last 3 to 4 days and may include nervousness, shakiness, nausea, sweating, sleeplessness, and can even cause seizures and a serious withdrawal symptom called delirium tremens, or DTs. During this time, it is best that you stay with family or friends who can help and support you. You can also admit yourself to a residential detox program. If your symptoms are severe (seizures, severe shakiness, confusion), contact your doctor or call an ambulance for help (see below).   Follow-up care  If alcohol is a problem in your life, these are some organizations that can help you:    Alcoholics Anonymous offers support through a self-help fellowship. There are no dues or fees. See the Yellow Pages and call for time and place of meetings. Find AA online at www.aa.org.    [employer] offers support to families of alcohol users. Contact 456-431-2043, or online at www.al-anodanna.org.    National [place] on Alcoholism and Drug Dependence can be reached at 874-088-4870, or online at www.ncadd.org.    There are also inpatient and residential alcohol detox programs. Check the Internet or phonebook Yellow Pages under  D[facility] Abuse [facility] Treatment Centers.   Call 911  Call 911 if any of these occur:    Trouble breathing or slow irregular breathing    Chest pain    Sudden weakness on one side of your body or sudden trouble speaking    Heavy bleeding or vomiting blood    Very drowsy or trouble awakening    Fainting or loss of consciousness    Rapid heart rate    Seizure  When to seek medical advice  Call your healthcare provider right away if any of these occur:    Severe shakiness     Fever over 100.4  F (38.0  C)    Confusion or hallucinations (seeing, hearing, or feeling things that are not there)    Pain in your upper abdomen that gets  worse    Repeated vomiting  Date Last Reviewed: 6/1/2016 2000-2017 [employer]. [address] [place] [address], [place] [address]. All rights reserved. This information is not intended as a substitute for professional medical care. Always follow your healthcare professional's instructions.          Hypokalemia  Hypokalemia means a low level of potassium in the blood. This most often occurs in people who take diuretics (water pills). It can also occur due to severe vomiting or diarrhea.  It is also seen in people who take laxatives for long periods of time. It can sometimes be associated with hypomagnesemia (low magnesium) which needs to be corrected before your potassium levels can be fixed.  A mild case usually causes no symptoms. It is only found with blood testing. More severe potassium loss causes generalized weakness, muscle or abdominal cramping, heart palpitations (rapid or irregular heartbeats), low blood pressure, specific muscle weakness, and in some people who are paralyzed.   Home care    Take any potassium supplements prescribed.    Eat foods rich in potassium. The highest amount is found in avocado, baked potatoes, spinach, cantaloupe, cod, halibut, salmon, and scallops. White, red, or patrick beans are also very good sources. A modest amount is found in orange juice, bananas, carrots, and tomato juice.    If you take certain types of diuretics, you will also need to take potassium supplements. If you take a diuretic, discuss potassium supplements with your doctor.  Follow-up care  Follow up with your healthcare provider for a repeat blood test within the next week or as advised by our staff.  When to seek medical advice  Call your healthcare provider if any of the following occur:    Increased weakness, fatigue, muscle cramps    Dizziness  Call 911  Call 911 if any of the following occur:    Irregular heartbeat, extra beats or very fast heart rate    Loss of consciousness  Date Last Reviewed:  7/1/2017 2000-2017 [employer]. [address] [place] [address], [place] [address]. All rights reserved. This information is not intended as a substitute for professional medical care. Always follow your healthcare professional's instructions.

## 2018-01-11 NOTE — ED NOTES
"PT walked up and down the hallway with the assistance of a cane.  PT Maintained a steady gait throughout and stated they felt \"fine.\"  "

## 2018-01-12 NOTE — ED PROVIDER NOTES
"  History     Chief Complaint:  Alcohol Intoxication (per EMS pt told wife to call for help because the patient was having a hard time speakinhg. pt admits to ETOH use and has a hx of alcoholism. 0.145 on a poor sample per PD.)      HPI   Du Travis is a 82 year old male who presents with complaints of \"not feeling right\"  He cannot expand on why he does not feel right.  He asked his wife to \"call the hospital\" from his bed.  He was not attempting to get out of bed.    He denies CP, SOB, HA, confusion, fall/trauma, new medications, vomiting, diarrhea or fever.  He admits to drinking alcohol.    Allergies:  Allergies   Allergen Reactions     Niacin         Medications:      metoprolol (TOPROL-XL) 100 MG 24 hr tablet   amLODIPine (NORVASC) 5 MG tablet   Vitamin D, Cholecalciferol, 1000 UNITS TABS   atorvastatin (LIPITOR) 40 MG tablet   lisinopril (PRINIVIL,ZESTRIL) 20 MG tablet   fish oil-omega-3 fatty acids 1000 MG capsule   PANTOPRAZOLE SODIUM PO   ASPIRIN PO   Multiple Vitamins-Minerals (CENTRUM SILVER) per tablet   NAPROXEN PO   allopurinol (ZYLOPRIM) 300 MG tablet       Problem List:    Patient Active Problem List    Diagnosis Date Noted     Trochanteric bursitis of left hip 05/15/2016     Priority: Medium     Hypertension      Priority: Medium     Atrial fibrillation (H)      Priority: Medium     found on event monitor 12/26/14       CAD (coronary artery disease) 12/01/2014     Priority: Medium     Syncope 10/01/2014     Priority: Medium     On flight to [place] October 2014       Weakness 12/15/2012     Priority: Medium        Past Medical History:    Past Medical History:   Diagnosis Date     Arthritis      Atrial fibrillation (H)      CAD (coronary artery disease)      Erosive gastritis      Gastro-oesophageal reflux disease      Hypertension      Other and unspecified hyperlipidemia      Snores      Syncope October 2014       Past Surgical History:    Past Surgical History:   Procedure Laterality " "Date     BIOPSY       CHOLECYSTECTOMY       COLONOSCOPY       CORONARY ANGIOGRAPHY ADULT ORDER  12/2012    diffuse Diag disease, Med management.     ORTHOPEDIC SURGERY      bilateral hip replaced, and right knee replaced     PHACOEMULSIFICATION CLEAR CORNEA WITH STANDARD INTRAOCULAR LENS IMPLANT  5/21/2013    Procedure: PHACOEMULSIFICATION CLEAR CORNEA WITH STANDARD INTRAOCULAR LENS IMPLANT;  RIGHT PHACOEMULSIFICATION CLEAR CORNEA WITH STANDARD INTRAOCULAR LENS IMPLANT ;  Surgeon: Diana Edward MD;  Location: [facility]     PHACOEMULSIFICATION CLEAR CORNEA WITH STANDARD INTRAOCULAR LENS IMPLANT  5/28/2013    Procedure: PHACOEMULSIFICATION CLEAR CORNEA WITH STANDARD INTRAOCULAR LENS IMPLANT;  LEFT PHACOEMULSIFICATION CLEAR CORNEA WITH STANDARD INTRAOCULAR LENS IMPLANT ;  Surgeon: Diana Edward MD;  Location: [facility]       Family History:    Family History   Problem Relation Age of Onset     Other - See Comments Mother 94     natural causes     CANCER Father 59       Social History:  Marital Status:   [2]  Social History   Substance Use Topics     Smoking status: Former Smoker     Types: Cigars     Smokeless tobacco: Never Used      Comment: quit 35 years ago     Alcohol use 3.5 oz/week     7 drink(s) per week      Comment: daily        Review of Systems  See the HPI, otherwise the rest of the ROS is negative.      Physical Exam   First Vitals:  BP: 145/71  Pulse: 89  Heart Rate: 85  Temp: 98.3  F (36.8  C)  Resp: 16  Height: 190.5 cm (6' 3\")  Weight: 122.5 kg (270 lb)  SpO2: 92 %      Physical Exam    Patient Vitals for the past 24 hrs:   BP Temp Temp src Pulse Heart Rate Resp SpO2 Height Weight   01/11/18 0200 - - - - 84 13 - - -   01/11/18 0130 142/73 - - - 82 23 93 % - -   01/11/18 0115 - - - - 82 30 93 % - -   01/11/18 0100 136/77 - - - 82 (!) 33 94 % - -   01/11/18 0045 - - - - 83 8 97 % - -   01/11/18 0030 152/80 - - - 85 13 96 % - -   01/11/18 0015 - - - - 85 28 95 % - -   01/11/18 0000 134/72 - - - - " "- - - -   01/10/18 2347 145/71 98.3  F (36.8  C) Oral 89 - 16 95 % 1.905 m (6' 3\") 122.5 kg (270 lb)   01/10/18 2345 - - - - - - 92 % - -       General: Alert and Interactive.  He smells of alcohol.   Head: No signs of trauma.   Mouth/Throat: Oropharynx is clear and moist.   Eyes: Conjunctivae are normal. Pupils are equal, round, and reactive to light.   Neck: Normal range of motion. No nuchal rigidity.   CV: Normal rate and regular rhythm.    Resp: Effort normal and breath sounds normal. No respiratory distress.   GI: Soft. There is no tenderness or guarding.   MSK: Normal range of motion. no edema.   Neuro: The patient is alert and oriented to person, place, and time.  PERRLA, EOMI, strength in upper/lower extremities normal and symmetrical.   Sensation normal. Speech normal.  GCS eye subscore is 4. GCS verbal subscore is 5. GCS motor subscore is 6.   Skin: Skin is warm and dry. No rash noted.   Psych: normal mood and affect. behavior is normal.         Emergency Department Course   ECG:  No acute changes, Sinus Rhythm, Normal Rate    Imaging:  None    Laboratory:  Alcohol level blood (Final result)    Abnormal Component (Lab Inquiry)       Collection Time Result Time Ethanol g/dL     01/10/18 23:50:00 01/11/18 01:13:27 0.19 (H)                      Acetaminophen level (Final result) Component (Lab Inquiry)       Collection Time Result Time Acetaminophen Level     01/10/18 23:50:00 01/11/18 01:15:29 <2  Therapeutic range: 10-20 mg/L                      Salicylate level (Final result) Component (Lab Inquiry)       Collection Time Result Time Salicylate Level     01/10/18 23:50:00 01/11/18 01:15:29 Therapeutic: &lte;20   Anti inflammatory: 15-30    ' data-bubble=\"[email or web address]\"><2  Therapeutic: <2...                      Comprehensive metabolic panel (Final result)    Abnormal Component (Lab Inquiry)       Collection Time Result Time NA POTASSIUM Chloride Carbon Dioxide ANION GAP     01/10/18 23:50:00 " 01/11/18 01:16:30 140 3.3 (L) 107 24 9          Collection Time Result Time GLUCOSE BUN CREATININE GFR Estimate GFR Estimate If Black     01/10/18 23:50:00 01/11/18 01:16:30 89 25 1.18 59 (L)  Non  GFR Calc 72   GFR Calc          Collection Time Result Time Calcium Bilirubin Total ALBUMIN Protein Total ALKPHOS     01/10/18 23:50:00 01/11/18 01:16:30 8.4 (L) 0.1 (L) 2.7 (L) 6.4 (L) 81          Collection Time Result Time ALT AST     01/10/18 23:50:00 01/11/18 01:16:30 39 38                      CBC with platelets + differential (Final result)    Abnormal Component (Lab Inquiry)       Collection Time Result Time WBC RBC HGB HCT MCV     01/10/18 23:50:00 01/11/18 00:53:52 9.9 3.65 (L) 12.6 (L) 36.6 (L) 100          Collection Time Result Time MCH MCHC RDW PLT DIFF METHO     01/10/18 23:50:00 01/11/18 00:53:52 34.5 (H) 34.4 14.2 398 Automated Method          Collection Time Result Time % Neutrophils % Lymphocytes % Monocytes % Eosinophils % Basophils     01/10/18 23:50:00 01/11/18 00:53:52 34.6 47.3 11.0 4.1 0.5          Collection Time Result Time % Immature Granulocytes Nucleated RBCs Absolute Neutrophil Absolute Lymphocytes Absolute Monocytes     01/10/18 23:50:00 01/11/18 00:53:52 2.5 0 3.4 4.7 1.1          Collection Time Result Time Absolute Eosinophils Absolute Basophils Abs Immature Granulocytes Absolute Nucleated RBC     01/10/18 23:50:00 01/11/18 00:53:52 0.4 0.1 0.3 0.0                 Procedures:  Road Test Ambulating:  Passed    Interventions:  IV, Cardiac monitor, PO potassium    Emergency Department Course/Medical Decision Making:  Du Travis is a 82 year old male who presents for evaluation of altered mental status.  He is intoxicated here in ED and this is the most likely etiology for their AMS.  Nonetheless a broad differential diagnosis was considered for the altered state including drug ingestion, infection, intracerebral issues, metabolic derangements,  psychiatric decompensation.  His head to toe exam is normal except for signs of alcohol intoxication.     Blood work otherwise looks ok; no signs of alcoholic ketoacidosis, significant liver impairment or acute alcoholic hepatitis.  He has no history of DT's or alcohol withdrawal seizures. There are no signs of co-ingestion including acetaminophen, drugs, medications, volatile alcohols.  He has no signs of trauma related to alcohol use and no further workup is needed including head CT.  His potassium was decreased, so PO replacement was started in the ED.    Supportive outpatient management is indicated as I do not believe there is a coingestion nor do I anticipate deterioration from their current level of mentation. He was ambulated and did well here in ED.  The patient will be discharged into the care of his son and wife.   Alcohol abuse precautions are given for home.     Diagnosis:    ICD-10-CM    1. Altered mental status, unspecified altered mental status type R41.82    2. Alcoholic intoxication without complication (H) F10.920    3. Hypokalemia E87.6        Disposition:  discharged to home under the care of his wife and son.    Discharge Medications:  none    Lang Antoine  1/10/2018    EMERGENCY DEPARTMENT       Lang Antoine MD  01/12/18 0327

## 2018-01-15 DIAGNOSIS — E78.5 HLD (HYPERLIPIDEMIA): ICD-10-CM

## 2018-01-15 RX ORDER — ATORVASTATIN CALCIUM 40 MG/1
40 TABLET, FILM COATED ORAL DAILY
Qty: 90 TABLET | Refills: 3 | Status: SHIPPED | OUTPATIENT
Start: 2018-01-15 | End: 2019-04-22

## 2018-03-09 ENCOUNTER — TRANSFERRED RECORDS (OUTPATIENT)
Dept: HEALTH INFORMATION MANAGEMENT | Facility: CLINIC | Age: 83
End: 2018-03-09

## 2018-03-09 LAB
ALBUMIN SERPL-MCNC: 3.9 G/DL
ALP SERPL-CCNC: 80 U/L
ALT SERPL-CCNC: 17 U/L
ANION GAP SERPL CALCULATED.3IONS-SCNC: 14 MMOL/L
AST SERPL-CCNC: 36 U/L
BILIRUB SERPL-MCNC: 0.59 MG/DL
BUN SERPL-MCNC: 15 MG/DL
CALCIUM SERPL-MCNC: 9.6 MG/DL
CHLORIDE SERPLBLD-SCNC: 104 MMOL/L
CHOLEST SERPL-MCNC: 132 MG/DL
CO2 SERPL-SCNC: 28 MMOL/L
CREAT SERPL-MCNC: 1.2 MG/DL
ERYTHROCYTE [DISTWIDTH] IN BLOOD BY AUTOMATED COUNT: 16 %
FERRITIN SERPL-MCNC: 134.1 NG/ML
GFR SERPL CREATININE-BSD FRML MDRD: 75 ML/MIN/1.73M2
GLUCOSE SERPL-MCNC: 91 MG/DL (ref 70–99)
HBA1C MFR BLD: 5.3 % (ref 0–5.7)
HCT VFR BLD AUTO: 38.2 %
HDLC SERPL-MCNC: 41 MG/DL
HEMOGLOBIN: 12.8 G/DL (ref 13.3–17.7)
IRON BINDING CAP: 313
IRON SATURATION INDEX: 40.3 %
IRON: 126 UG/DL
LDLC SERPL CALC-MCNC: 64 MG/DL
MCH RBC QN AUTO: 34.1 PG
MCHC RBC AUTO-ENTMCNC: 33.6 G/DL
MCV RBC AUTO: 101.5 FL
NONHDLC SERPL-MCNC: 91 MG/DL
PLATELET # BLD AUTO: 279 10^9/L
POTASSIUM SERPL-SCNC: 4.8 MMOL/L
PROT SERPL-MCNC: 6.3 G/DL
RBC # BLD AUTO: 3.77 10^12/L
SODIUM SERPL-SCNC: 141 MMOL/L
T4 FREE SERPL-MCNC: 1.1 NG/DL
TRIGL SERPL-MCNC: 136 MG/DL
TSH SERPL-ACNC: 4.71 MCU/ML
WBC # BLD AUTO: 7.6 10^9/L

## 2018-05-09 ENCOUNTER — CARE COORDINATION (OUTPATIENT)
Dept: CARDIOLOGY | Facility: CLINIC | Age: 83
End: 2018-05-09

## 2018-05-09 NOTE — PROGRESS NOTES
"Pt calls today to discuss ordered f/u OV with Dr. Stoner.  Per last OV note from Dr. CUMMINGS (11/17), \" We will increase the dose of metoprolol to 100 mg per day.  I will start him on hydrochlorothiazide 12.5 mg per day.  In 1 weeks' time I will have the patient return for a nurse visit to recheck his blood pressure on those medications.  On that day we will also check a basic metabolic profile.  I will see the patient myself in 6 months' time when he comes back from [place].  It has been a pleasure to be involved in the care of this very nice patient.\"    Pt states he was seen my his physician in [place] for an annual physical and has also seen his PMD at [facility] since returning to [place] from [place] and feels the f/u OV with Dr. CUMMINGS is not necessary.  Pt states his BP has been well controlled, no medication changes have been made by either of the physicians he has seen and labs have been completed.  Order date extended until 11/2018 when pt will be due for his annual cardiology f/u as he prefers to wait until this time for follow-up.  Pt also states he will have records sent from his physician in [place] for our records.        "

## 2018-06-07 ENCOUNTER — DOCUMENTATION ONLY (OUTPATIENT)
Dept: CARDIOLOGY | Facility: CLINIC | Age: 83
End: 2018-06-07

## 2018-11-01 DIAGNOSIS — I25.10 CORONARY ARTERY DISEASE INVOLVING NATIVE CORONARY ARTERY OF NATIVE HEART WITHOUT ANGINA PECTORIS: ICD-10-CM

## 2018-11-01 RX ORDER — METOPROLOL SUCCINATE 100 MG/1
100 TABLET, EXTENDED RELEASE ORAL DAILY
Qty: 90 TABLET | Refills: 0 | Status: SHIPPED | OUTPATIENT
Start: 2018-11-01

## 2018-11-19 DIAGNOSIS — I10 BENIGN ESSENTIAL HYPERTENSION: ICD-10-CM

## 2018-11-19 RX ORDER — AMLODIPINE BESYLATE 5 MG/1
5 TABLET ORAL DAILY
Qty: 90 TABLET | Refills: 0 | Status: SHIPPED | OUTPATIENT
Start: 2018-11-19 | End: 2019-04-22

## 2019-01-31 DIAGNOSIS — E78.5 HLD (HYPERLIPIDEMIA): Primary | ICD-10-CM

## 2019-04-22 ENCOUNTER — OFFICE VISIT (OUTPATIENT)
Dept: CARDIOLOGY | Facility: CLINIC | Age: 84
End: 2019-04-22
Payer: MEDICARE

## 2019-04-22 VITALS
HEIGHT: 75 IN | HEART RATE: 78 BPM | DIASTOLIC BLOOD PRESSURE: 70 MMHG | BODY MASS INDEX: 33.45 KG/M2 | SYSTOLIC BLOOD PRESSURE: 130 MMHG | WEIGHT: 269 LBS

## 2019-04-22 DIAGNOSIS — E78.5 HLD (HYPERLIPIDEMIA): ICD-10-CM

## 2019-04-22 DIAGNOSIS — I10 ESSENTIAL HYPERTENSION: ICD-10-CM

## 2019-04-22 DIAGNOSIS — I10 BENIGN ESSENTIAL HYPERTENSION: ICD-10-CM

## 2019-04-22 DIAGNOSIS — I25.10 CORONARY ARTERY DISEASE INVOLVING NATIVE CORONARY ARTERY OF NATIVE HEART WITHOUT ANGINA PECTORIS: Primary | ICD-10-CM

## 2019-04-22 DIAGNOSIS — E78.2 MIXED HYPERLIPIDEMIA: ICD-10-CM

## 2019-04-22 LAB
ALT SERPL W P-5'-P-CCNC: <5 U/L (ref 5–30)
ANION GAP SERPL CALCULATED.3IONS-SCNC: 17.7 MMOL/L (ref 6–17)
BUN SERPL-MCNC: 26 MG/DL (ref 7–30)
CALCIUM SERPL-MCNC: 9.4 MG/DL (ref 8.5–10.5)
CHLORIDE SERPL-SCNC: 108 MMOL/L (ref 98–107)
CHOLEST SERPL-MCNC: 138 MG/DL
CO2 SERPL-SCNC: 19 MMOL/L (ref 23–29)
CREAT SERPL-MCNC: 1.7 MG/DL (ref 0.7–1.3)
GFR SERPL CREATININE-BSD FRML MDRD: 39 ML/MIN/{1.73_M2}
GLUCOSE SERPL-MCNC: 113 MG/DL (ref 70–105)
HDLC SERPL-MCNC: 36 MG/DL
LDLC SERPL CALC-MCNC: 41 MG/DL
NONHDLC SERPL-MCNC: 102 MG/DL
POTASSIUM SERPL-SCNC: 4.7 MMOL/L (ref 3.5–5.1)
SODIUM SERPL-SCNC: 140 MMOL/L (ref 136–145)
TRIGL SERPL-MCNC: 307 MG/DL

## 2019-04-22 PROCEDURE — 36415 COLL VENOUS BLD VENIPUNCTURE: CPT | Performed by: INTERNAL MEDICINE

## 2019-04-22 PROCEDURE — 80061 LIPID PANEL: CPT | Performed by: INTERNAL MEDICINE

## 2019-04-22 PROCEDURE — 80048 BASIC METABOLIC PNL TOTAL CA: CPT | Performed by: INTERNAL MEDICINE

## 2019-04-22 PROCEDURE — 84460 ALANINE AMINO (ALT) (SGPT): CPT | Performed by: INTERNAL MEDICINE

## 2019-04-22 PROCEDURE — 99214 OFFICE O/P EST MOD 30 MIN: CPT | Performed by: INTERNAL MEDICINE

## 2019-04-22 RX ORDER — AMLODIPINE BESYLATE 5 MG/1
5 TABLET ORAL DAILY
Qty: 90 TABLET | Refills: 3 | Status: SHIPPED | OUTPATIENT
Start: 2019-04-22 | End: 2020-05-26

## 2019-04-22 RX ORDER — ATORVASTATIN CALCIUM 40 MG/1
40 TABLET, FILM COATED ORAL DAILY
Qty: 90 TABLET | Refills: 3 | Status: SHIPPED | OUTPATIENT
Start: 2019-04-22

## 2019-04-22 RX ORDER — DOCUSATE SODIUM 100 MG/1
100 CAPSULE, LIQUID FILLED ORAL
COMMUNITY

## 2019-04-22 RX ORDER — FERROUS SULFATE 325(65) MG
325 TABLET ORAL DAILY
Status: ON HOLD | COMMUNITY
End: 2019-07-19

## 2019-04-22 RX ORDER — ACETAMINOPHEN 500 MG
500-1000 TABLET ORAL 2 TIMES DAILY
COMMUNITY

## 2019-04-22 ASSESSMENT — MIFFLIN-ST. JEOR: SCORE: 2002.87

## 2019-04-22 NOTE — LETTER
4/22/2019      Kendall Duvall MD  [address] [address]      RE: Du Travis       Dear Colleague,    I had the pleasure of seeing Du Travis in the [facility].    Service Date: 04/22/2019      REFERRING PHYSICIAN:  Kendall Duvall MD.      HISTORY OF PRESENT ILLNESS:  It is my pleasure to see your patient, Du Travis, who is a very pleasant 83-year-old gentleman with a history of diffuse coronary artery disease with small diagonal artery disease.  The major epicardial vessels have no flow-limiting disease.  He also has a history of essential hypertension which has now come under good control.  He has mixed hyperlipidemia.  In the past, he has renal insufficiency.      Since I last saw him, he has been feeling relatively well.  He had 2 major noncardiac problems.  One is that he developed severe anemia in [place].  He has been given iron.  He did have hemorrhoidal bleeding which was felt possibly to be the cause.  I do not know if he has had bowel investigations by his physician in [place].  His hemoglobin is rising on oral iron.  He did ask me questions about the different types of oral iron, but I told him that I really was not up-to-date on that and for the question to be addressed by you.  The second problem is orthopedic with hip discomfort which has greatly limited his ability to walk.  That might account for his HDL dropping from 41 down to 36 and his triglycerides rising from 136-307.  His LDL cholesterol is excellent at 41.      His blood pressure is well controlled today at 130/70.  He does not have chest pains or chest pressure.  He has not noticed any increasing ankle edema.  His renal function today is abnormal.  The creatinine has risen from 1.2 in March of last year to 1.70 today.  His GFR has dropped from 75 to 39.  I do note back in 12/2017, his creatinine was as high as 1.89,  but then dropped back down to 1.2 in 03/2018.  Looking at his medications, the only medication that has the potential to cause renal insufficiency is lisinopril and he has been on that medication for many years.  He only uses acetaminophen for hip discomfort.  He does not use nonsteroidal anti-inflammatory drugs anymore.  With respect to hydration, it is unclear how much he hydrates.      IMPRESSION:   1.  Coronary artery disease.  The patient is asymptomatic with respect to coronary artery disease with no symptoms of angina pectoris.   2.  Essential hypertension.  His blood pressure is well controlled.   3.  Renal insufficiency.  The cause of this is unclear.  The only potential nephrotoxic drug is lisinopril and he has been on that for many years.   4.  Mixed hyperlipidemia.  He has mild HDL deficiency and hypertriglyceridemia, but LDL is excellent.  The cause of this is unclear, but certainly inactivity and increased carbohydrate intake may be the cause.  He is also significantly overweight with a BMI of 34.      PLAN:  I have asked the patient to contact you with respect to further workup of his renal insufficiency.  I have asked him to try and exercise as much as he can and to reduce his carbohydrate intake.  I will have the patient follow up in 1 year's time.  I will repeat his lipids at that stage.  I did ask him to look out for signs of angina pectoris such as chest pain, chest pressure, arm, throat or jaw discomfort and also symptoms of volume overload such as increasing ankle edema or shortness of breath.  So, they are aware of that.      Many thanks for allowing me to be involved in the care of this very nice patient.  As always, if he has any questions or if you have any questions or concerns, please feel free to contact me.      cc:   Kendall Duvall MD   [address]  [address]         TAYLOR SENIOR MD, [facility]             D: 04/22/2019   T:  2019   MT: SMITH      Name:     KELLY COTO   MRN:      [id number]        Account:      [id number]   :      1935           Service Date: 2019      Document: [id number]           Outpatient Encounter Medications as of 2019   Medication Sig Dispense Refill     acetaminophen (TYLENOL) 500 MG tablet Take 500-1,000 mg by mouth 2 times daily       allopurinol (ZYLOPRIM) 300 MG tablet TAKE 1 TABLET BY MOUTH DAILY 90 tablet 3     amLODIPine (NORVASC) 5 MG tablet Take 1 tablet (5 mg) by mouth daily 90 tablet 3     ASPIRIN PO Take 81 mg by mouth daily.       atorvastatin (LIPITOR) 40 MG tablet Take 1 tablet (40 mg) by mouth daily 90 tablet 3     docusate sodium (COLACE) 100 MG capsule Take 100 mg by mouth daily       ferrous sulfate (FEROSUL) 325 (65 Fe) MG tablet Take 325 mg by mouth daily       fish oil-omega-3 fatty acids 1000 MG capsule Take 2 g by mouth daily       lisinopril (PRINIVIL,ZESTRIL) 20 MG tablet Take 40 mg by mouth daily  1 tablet 0     metoprolol succinate (TOPROL-XL) 100 MG 24 hr tablet Take 1 tablet (100 mg) by mouth daily 90 tablet 0     Multiple Vitamins-Minerals (CENTRUM SILVER) per tablet Take 1 tablet by mouth daily.       PANTOPRAZOLE SODIUM PO Take 40 mg by mouth every morning (before breakfast).       Vitamin D, Cholecalciferol, 1000 UNITS TABS        [DISCONTINUED] amLODIPine (NORVASC) 5 MG tablet Take 1 tablet (5 mg) by mouth daily 90 tablet 0     [DISCONTINUED] atorvastatin (LIPITOR) 40 MG tablet Take 1 tablet (40 mg) by mouth daily 90 tablet 3     [DISCONTINUED] NAPROXEN PO Take 220 mg by mouth 440 mg in the morning and 220 mg in the evening       No facility-administered encounter medications on file as of 2019.        Again, thank you for allowing me to participate in the care of your patient.      Sincerely,    Curtis Stoner MD, MD     [facility]

## 2019-04-22 NOTE — PROGRESS NOTES
HPI and Plan:   See dictation    Orders Placed This Encounter   Procedures     Basic metabolic panel     Lipid Profile     ALT     Follow-Up with Cardiologist       Orders Placed This Encounter   Medications     acetaminophen (TYLENOL) 500 MG tablet     Sig: Take 500-1,000 mg by mouth 2 times daily     ferrous sulfate (FEROSUL) 325 (65 Fe) MG tablet     Sig: Take 325 mg by mouth daily     docusate sodium (COLACE) 100 MG capsule     Sig: Take 100 mg by mouth daily     atorvastatin (LIPITOR) 40 MG tablet     Sig: Take 1 tablet (40 mg) by mouth daily     Dispense:  90 tablet     Refill:  3     amLODIPine (NORVASC) 5 MG tablet     Sig: Take 1 tablet (5 mg) by mouth daily     Dispense:  90 tablet     Refill:  3       Medications Discontinued During This Encounter   Medication Reason     NAPROXEN PO Alternate therapy     atorvastatin (LIPITOR) 40 MG tablet Reorder     amLODIPine (NORVASC) 5 MG tablet          Encounter Diagnoses   Name Primary?     Mixed hyperlipidemia      Benign essential hypertension      Coronary artery disease involving native coronary artery of native heart without angina pectoris Yes       CURRENT MEDICATIONS:  Current Outpatient Medications   Medication Sig Dispense Refill     acetaminophen (TYLENOL) 500 MG tablet Take 500-1,000 mg by mouth 2 times daily       allopurinol (ZYLOPRIM) 300 MG tablet TAKE 1 TABLET BY MOUTH DAILY 90 tablet 3     amLODIPine (NORVASC) 5 MG tablet Take 1 tablet (5 mg) by mouth daily 90 tablet 3     ASPIRIN PO Take 81 mg by mouth daily.       atorvastatin (LIPITOR) 40 MG tablet Take 1 tablet (40 mg) by mouth daily 90 tablet 3     docusate sodium (COLACE) 100 MG capsule Take 100 mg by mouth daily       ferrous sulfate (FEROSUL) 325 (65 Fe) MG tablet Take 325 mg by mouth daily       fish oil-omega-3 fatty acids 1000 MG capsule Take 2 g by mouth daily       lisinopril (PRINIVIL,ZESTRIL) 20 MG tablet Take 40 mg by mouth daily  1 tablet 0     metoprolol succinate (TOPROL-XL)  100 MG 24 hr tablet Take 1 tablet (100 mg) by mouth daily 90 tablet 0     Multiple Vitamins-Minerals (CENTRUM SILVER) per tablet Take 1 tablet by mouth daily.       PANTOPRAZOLE SODIUM PO Take 40 mg by mouth every morning (before breakfast).       Vitamin D, Cholecalciferol, 1000 UNITS TABS          ALLERGIES     Allergies   Allergen Reactions     Niacin        PAST MEDICAL HISTORY:  Past Medical History:   Diagnosis Date     Arthritis      Atrial fibrillation (H)     found on event monitor 12/26/14     CAD (coronary artery disease)      Erosive gastritis      Gastro-oesophageal reflux disease      Hypertension      Other and unspecified hyperlipidemia      Snores      Syncope October 2014    On flight to [place] October 2014       PAST SURGICAL HISTORY:  Past Surgical History:   Procedure Laterality Date     BIOPSY       CHOLECYSTECTOMY       COLONOSCOPY       CORONARY ANGIOGRAPHY ADULT ORDER  12/2012    diffuse Diag disease, Med management.     ORTHOPEDIC SURGERY      bilateral hip replaced, and right knee replaced     PHACOEMULSIFICATION CLEAR CORNEA WITH STANDARD INTRAOCULAR LENS IMPLANT  5/21/2013    Procedure: PHACOEMULSIFICATION CLEAR CORNEA WITH STANDARD INTRAOCULAR LENS IMPLANT;  RIGHT PHACOEMULSIFICATION CLEAR CORNEA WITH STANDARD INTRAOCULAR LENS IMPLANT ;  Surgeon: Diana Edward MD;  Location: [facility]     PHACOEMULSIFICATION CLEAR CORNEA WITH STANDARD INTRAOCULAR LENS IMPLANT  5/28/2013    Procedure: PHACOEMULSIFICATION CLEAR CORNEA WITH STANDARD INTRAOCULAR LENS IMPLANT;  LEFT PHACOEMULSIFICATION CLEAR CORNEA WITH STANDARD INTRAOCULAR LENS IMPLANT ;  Surgeon: Diana Edward MD;  Location: [facility]       FAMILY HISTORY:  Family History   Problem Relation Age of Onset     Other - See Comments Mother 94        natural causes     Cancer Father 59       SOCIAL HISTORY:  Social History     Socioeconomic History     Marital status:      Spouse name: None     Number of children: None     Years of  education: None     Highest education level: None   Occupational History     None   Social Needs     Financial resource strain: None     Food insecurity:     Worry: None     Inability: None     Transportation needs:     Medical: None     Non-medical: None   Tobacco Use     Smoking status: Former Smoker     Packs/day: 1.00     Years: 34.00     Pack years: 34.00     Types: Cigars     Start date:      Last attempt to quit:      Years since quittin.3     Smokeless tobacco: Never Used   Substance and Sexual Activity     Alcohol use: Yes     Alcohol/week: 3.5 oz     Types: 7 Standard drinks or equivalent per week     Comment: daily     Drug use: No     Sexual activity: None   Lifestyle     Physical activity:     Days per week: None     Minutes per session: None     Stress: None   Relationships     Social connections:     Talks on phone: None     Gets together: None     Attends Oriental orthodox service: None     Active member of club or organization: None     Attends meetings of clubs or organizations: None     Relationship status: None     Intimate partner violence:     Fear of current or ex partner: None     Emotionally abused: None     Physically abused: None     Forced sexual activity: None   Other Topics Concern     Parent/sibling w/ CABG, MI or angioplasty before 65F 55M? Not Asked      Service Not Asked     Blood Transfusions Not Asked     Caffeine Concern No     Comment: 2 coffee in the morning, cafeine free cola, water-2 large     Occupational Exposure Not Asked     Hobby Hazards Not Asked     Sleep Concern Not Asked     Stress Concern Not Asked     Weight Concern Not Asked     Special Diet Not Asked     Back Care Not Asked     Exercise No     Bike Helmet Not Asked     Seat Belt Yes     Self-Exams Not Asked   Social History Narrative     None       Review of Systems:  Skin:  Negative       Eyes:  Positive for glasses    ENT:  Positive for hearing loss    Respiratory:  Positive for dyspnea on  "exertion(stable.  doesn't use steps very often. some walking)     Cardiovascular:    Positive for;dizziness(feels do to low hgb 8.8)    Gastroenterology: Negative      Genitourinary:  not assessed      Musculoskeletal:  Positive for joint pain;arthritis    Neurologic:  Negative      Psychiatric:  Negative      Heme/Lymph/Imm:  Positive for allergies    Endocrine:  Negative        Physical Exam:  Vitals: /70   Pulse 78   Ht 1.892 m (6' 2.5\")   Wt 122 kg (269 lb)   BMI 34.08 kg/m      Constitutional:  cooperative, alert and oriented, well developed, well nourished, in no acute distress obese LARGE STOUT GENTLEMAN    Skin:  warm and dry to the touch, no apparent skin lesions or masses noted venous stasis changes        Head:  normocephalic, no masses or lesions        Eyes:  pupils equal and round, conjunctivae and lids unremarkable, sclera white, no xanthalasma, EOMS intact, no nystagmus        Lymph:No Cervical lymphadenopathy present     ENT:  no pallor or cyanosis, dentition good        Neck:  carotid pulses are full and equal bilaterally, JVP normal, no carotid bruit        Respiratory:  normal breath sounds, clear to auscultation, normal A-P diameter, normal symmetry, normal respiratory excursion, no use of accessory muscles         Cardiac: regular rhythm, normal S1/S2, no S3 or S4, apical impulse not displaced, no murmurs, gallops or rubs   distant heart sounds            pulses full and equal, no bruits auscultated                                        GI:  abdomen soft, non-tender, BS normoactive, no mass, no HSM, no bruits obese      Extremities and Muscular Skeletal:  no deformities, clubbing, cyanosis, erythema observed;no edema         VARICOSE VEINS(Surgical scar over right knee)    Neurological:  no gross motor deficits;affect appropriate        Psych:  Alert and Oriented x 3        CC  No referring provider defined for this encounter.              "

## 2019-04-22 NOTE — LETTER
4/22/2019    Kendall Duvall MD  [address] [address]    RE: Du Travis       Dear Colleague,    I had the pleasure of seeing Du Travis in the [facility] [place] [facility].    HPI and Plan:   See dictation    Orders Placed This Encounter   Procedures     Basic metabolic panel     Lipid Profile     ALT     Follow-Up with Cardiologist       Orders Placed This Encounter   Medications     acetaminophen (TYLENOL) 500 MG tablet     Sig: Take 500-1,000 mg by mouth 2 times daily     ferrous sulfate (FEROSUL) 325 (65 Fe) MG tablet     Sig: Take 325 mg by mouth daily     docusate sodium (COLACE) 100 MG capsule     Sig: Take 100 mg by mouth daily     atorvastatin (LIPITOR) 40 MG tablet     Sig: Take 1 tablet (40 mg) by mouth daily     Dispense:  90 tablet     Refill:  3     amLODIPine (NORVASC) 5 MG tablet     Sig: Take 1 tablet (5 mg) by mouth daily     Dispense:  90 tablet     Refill:  3       Medications Discontinued During This Encounter   Medication Reason     NAPROXEN PO Alternate therapy     atorvastatin (LIPITOR) 40 MG tablet Reorder     amLODIPine (NORVASC) 5 MG tablet          Encounter Diagnoses   Name Primary?     Mixed hyperlipidemia      Benign essential hypertension      Coronary artery disease involving native coronary artery of native heart without angina pectoris Yes       CURRENT MEDICATIONS:  Current Outpatient Medications   Medication Sig Dispense Refill     acetaminophen (TYLENOL) 500 MG tablet Take 500-1,000 mg by mouth 2 times daily       allopurinol (ZYLOPRIM) 300 MG tablet TAKE 1 TABLET BY MOUTH DAILY 90 tablet 3     amLODIPine (NORVASC) 5 MG tablet Take 1 tablet (5 mg) by mouth daily 90 tablet 3     ASPIRIN PO Take 81 mg by mouth daily.       atorvastatin (LIPITOR) 40 MG tablet Take 1 tablet (40 mg) by mouth daily 90 tablet 3     docusate sodium (COLACE) 100 MG capsule Take 100 mg by mouth daily        ferrous sulfate (FEROSUL) 325 (65 Fe) MG tablet Take 325 mg by mouth daily       fish oil-omega-3 fatty acids 1000 MG capsule Take 2 g by mouth daily       lisinopril (PRINIVIL,ZESTRIL) 20 MG tablet Take 40 mg by mouth daily  1 tablet 0     metoprolol succinate (TOPROL-XL) 100 MG 24 hr tablet Take 1 tablet (100 mg) by mouth daily 90 tablet 0     Multiple Vitamins-Minerals (CENTRUM SILVER) per tablet Take 1 tablet by mouth daily.       PANTOPRAZOLE SODIUM PO Take 40 mg by mouth every morning (before breakfast).       Vitamin D, Cholecalciferol, 1000 UNITS TABS          ALLERGIES     Allergies   Allergen Reactions     Niacin        PAST MEDICAL HISTORY:  Past Medical History:   Diagnosis Date     Arthritis      Atrial fibrillation (H)     found on event monitor 12/26/14     CAD (coronary artery disease)      Erosive gastritis      Gastro-oesophageal reflux disease      Hypertension      Other and unspecified hyperlipidemia      Snores      Syncope October 2014    On flight to [place] October 2014       PAST SURGICAL HISTORY:  Past Surgical History:   Procedure Laterality Date     BIOPSY       CHOLECYSTECTOMY       COLONOSCOPY       CORONARY ANGIOGRAPHY ADULT ORDER  12/2012    diffuse Diag disease, Med management.     ORTHOPEDIC SURGERY      bilateral hip replaced, and right knee replaced     PHACOEMULSIFICATION CLEAR CORNEA WITH STANDARD INTRAOCULAR LENS IMPLANT  5/21/2013    Procedure: PHACOEMULSIFICATION CLEAR CORNEA WITH STANDARD INTRAOCULAR LENS IMPLANT;  RIGHT PHACOEMULSIFICATION CLEAR CORNEA WITH STANDARD INTRAOCULAR LENS IMPLANT ;  Surgeon: Diana Edward MD;  Location: [facility]     PHACOEMULSIFICATION CLEAR CORNEA WITH STANDARD INTRAOCULAR LENS IMPLANT  5/28/2013    Procedure: PHACOEMULSIFICATION CLEAR CORNEA WITH STANDARD INTRAOCULAR LENS IMPLANT;  LEFT PHACOEMULSIFICATION CLEAR CORNEA WITH STANDARD INTRAOCULAR LENS IMPLANT ;  Surgeon: Diana Edward MD;  Location: [facility]       FAMILY HISTORY:  Family  History   Problem Relation Age of Onset     Other - See Comments Mother 94        natural causes     Cancer Father 59       SOCIAL HISTORY:  Social History     Socioeconomic History     Marital status:      Spouse name: None     Number of children: None     Years of education: None     Highest education level: None   Occupational History     None   Social Needs     Financial resource strain: None     Food insecurity:     Worry: None     Inability: None     Transportation needs:     Medical: None     Non-medical: None   Tobacco Use     Smoking status: Former Smoker     Packs/day: 1.00     Years: 34.00     Pack years: 34.00     Types: Cigars     Start date:      Last attempt to quit:      Years since quittin.3     Smokeless tobacco: Never Used   Substance and Sexual Activity     Alcohol use: Yes     Alcohol/week: 3.5 oz     Types: 7 Standard drinks or equivalent per week     Comment: daily     Drug use: No     Sexual activity: None   Lifestyle     Physical activity:     Days per week: None     Minutes per session: None     Stress: None   Relationships     Social connections:     Talks on phone: None     Gets together: None     Attends [employer] service: None     Active member of club or organization: None     Attends meetings of clubs or organizations: None     Relationship status: None     Intimate partner violence:     Fear of current or ex partner: None     Emotionally abused: None     Physically abused: None     Forced sexual activity: None   Other Topics Concern     Parent/sibling w/ CABG, MI or angioplasty before 65F 55M? Not Asked      Service Not Asked     Blood Transfusions Not Asked     Caffeine Concern No     Comment: 2 coffee in the morning, cafeine free cola, water-2 large     Occupational Exposure Not Asked     Hobby Hazards Not Asked     Sleep Concern Not Asked     Stress Concern Not Asked     Weight Concern Not Asked     Special Diet Not Asked     Back Care Not Asked      "Exercise No     Bike Helmet Not Asked     Seat Belt Yes     Self-Exams Not Asked   Social History Narrative     None       Review of Systems:  Skin:  Negative       Eyes:  Positive for glasses    ENT:  Positive for hearing loss    Respiratory:  Positive for dyspnea on exertion(stable.  doesn't use steps very often. some walking)     Cardiovascular:    Positive for;dizziness(feels do to low hgb 8.8)    Gastroenterology: Negative      Genitourinary:  not assessed      Musculoskeletal:  Positive for joint pain;arthritis    Neurologic:  Negative      Psychiatric:  Negative      Heme/Lymph/Imm:  Positive for allergies    Endocrine:  Negative        Physical Exam:  Vitals: /70   Pulse 78   Ht 1.892 m (6' 2.5\")   Wt 122 kg (269 lb)   BMI 34.08 kg/m       Constitutional:  cooperative, alert and oriented, well developed, well nourished, in no acute distress obese LARGE STOUT GENTLEMAN    Skin:  warm and dry to the touch, no apparent skin lesions or masses noted venous stasis changes        Head:  normocephalic, no masses or lesions        Eyes:  pupils equal and round, conjunctivae and lids unremarkable, sclera white, no xanthalasma, EOMS intact, no nystagmus        Lymph:No Cervical lymphadenopathy present     ENT:  no pallor or cyanosis, dentition good        Neck:  carotid pulses are full and equal bilaterally, JVP normal, no carotid bruit        Respiratory:  normal breath sounds, clear to auscultation, normal A-P diameter, normal symmetry, normal respiratory excursion, no use of accessory muscles         Cardiac: regular rhythm, normal S1/S2, no S3 or S4, apical impulse not displaced, no murmurs, gallops or rubs   distant heart sounds            pulses full and equal, no bruits auscultated                                        GI:  abdomen soft, non-tender, BS normoactive, no mass, no HSM, no bruits obese      Extremities and Muscular Skeletal:  no deformities, clubbing, cyanosis, erythema observed;no edema   "       VARICOSE VEINS(Surgical scar over right knee)    Neurological:  no gross motor deficits;affect appropriate        Psych:  Alert and Oriented x 3        CC  No referring provider defined for this encounter.                Thank you for allowing me to participate in the care of your patient.      Sincerely,     Curtis Stoner MD, MD     [facility]    cc:   No referring provider defined for this encounter.

## 2019-04-22 NOTE — PROGRESS NOTES
Service Date: 04/22/2019      REFERRING PHYSICIAN:  Kendall Duvall MD.      HISTORY OF PRESENT ILLNESS:  It is my pleasure to see your patient, Du Trvais, who is a very pleasant 83-year-old gentleman with a history of diffuse coronary artery disease with small diagonal artery disease.  The major epicardial vessels have no flow-limiting disease.  He also has a history of essential hypertension which has now come under good control.  He has mixed hyperlipidemia.  In the past, he has renal insufficiency.      Since I last saw him, he has been feeling relatively well.  He had 2 major noncardiac problems.  One is that he developed severe anemia in [place].  He has been given iron.  He did have hemorrhoidal bleeding which was felt possibly to be the cause.  I do not know if he has had bowel investigations by his physician in [place].  His hemoglobin is rising on oral iron.  He did ask me questions about the different types of oral iron, but I told him that I really was not up-to-date on that and for the question to be addressed by you.  The second problem is orthopedic with hip discomfort which has greatly limited his ability to walk.  That might account for his HDL dropping from 41 down to 36 and his triglycerides rising from 136-307.  His LDL cholesterol is excellent at 41.      His blood pressure is well controlled today at 130/70.  He does not have chest pains or chest pressure.  He has not noticed any increasing ankle edema.  His renal function today is abnormal.  The creatinine has risen from 1.2 in March of last year to 1.70 today.  His GFR has dropped from 75 to 39.  I do note back in 12/2017, his creatinine was as high as 1.89, but then dropped back down to 1.2 in 03/2018.  Looking at his medications, the only medication that has the potential to cause renal insufficiency is lisinopril and he has been on that medication for many years.  He only uses acetaminophen for hip discomfort.  He does not use  nonsteroidal anti-inflammatory drugs anymore.  With respect to hydration, it is unclear how much he hydrates.      IMPRESSION:   1.  Coronary artery disease.  The patient is asymptomatic with respect to coronary artery disease with no symptoms of angina pectoris.   2.  Essential hypertension.  His blood pressure is well controlled.   3.  Renal insufficiency.  The cause of this is unclear.  The only potential nephrotoxic drug is lisinopril and he has been on that for many years.   4.  Mixed hyperlipidemia.  He has mild HDL deficiency and hypertriglyceridemia, but LDL is excellent.  The cause of this is unclear, but certainly inactivity and increased carbohydrate intake may be the cause.  He is also significantly overweight with a BMI of 34.      PLAN:  I have asked the patient to contact you with respect to further workup of his renal insufficiency.  I have asked him to try and exercise as much as he can and to reduce his carbohydrate intake.  I will have the patient follow up in 1 year's time.  I will repeat his lipids at that stage.  I did ask him to look out for signs of angina pectoris such as chest pain, chest pressure, arm, throat or jaw discomfort and also symptoms of volume overload such as increasing ankle edema or shortness of breath.  So, they are aware of that.      Many thanks for allowing me to be involved in the care of this very nice patient.  As always, if he has any questions or if you have any questions or concerns, please feel free to contact me.      cc:   [place] [address]         TAYLOR SENIOR MD, [facility]             D: 2019   T: 2019   MT: SMITH      Name:     KELLY COTO   MRN:      [id number]        Account:      [id number]   :      1935           Service Date: 2019      Document: [id number]

## 2019-07-19 ENCOUNTER — HOSPITAL ENCOUNTER (OUTPATIENT)
Facility: CLINIC | Age: 84
Discharge: HOME | End: 2019-07-19
Attending: INTERNAL MEDICINE | Admitting: INTERNAL MEDICINE
Payer: MEDICARE

## 2019-07-19 ENCOUNTER — ANESTHESIA EVENT (OUTPATIENT)
Dept: GASTROENTEROLOGY | Facility: CLINIC | Age: 84
End: 2019-07-19
Payer: MEDICARE

## 2019-07-19 ENCOUNTER — ANESTHESIA (OUTPATIENT)
Dept: GASTROENTEROLOGY | Facility: CLINIC | Age: 84
End: 2019-07-19
Payer: MEDICARE

## 2019-07-19 VITALS
OXYGEN SATURATION: 96 % | WEIGHT: 264 LBS | SYSTOLIC BLOOD PRESSURE: 140 MMHG | DIASTOLIC BLOOD PRESSURE: 75 MMHG | RESPIRATION RATE: 11 BRPM | HEART RATE: 70 BPM | BODY MASS INDEX: 32.83 KG/M2 | HEIGHT: 75 IN

## 2019-07-19 LAB
COLONOSCOPY: NORMAL
UPPER GI ENDOSCOPY: NORMAL

## 2019-07-19 PROCEDURE — 43239 EGD BIOPSY SINGLE/MULTIPLE: CPT | Performed by: INTERNAL MEDICINE

## 2019-07-19 PROCEDURE — 25800030 ZZH RX IP 258 OP 636: Performed by: NURSE ANESTHETIST, CERTIFIED REGISTERED

## 2019-07-19 PROCEDURE — 88305 TISSUE EXAM BY PATHOLOGIST: CPT | Performed by: INTERNAL MEDICINE

## 2019-07-19 PROCEDURE — 37000009 ZZH ANESTHESIA TECHNICAL FEE, EACH ADDTL 15 MIN: Performed by: INTERNAL MEDICINE

## 2019-07-19 PROCEDURE — 25000132 ZZH RX MED GY IP 250 OP 250 PS 637: Mod: GY | Performed by: NURSE ANESTHETIST, CERTIFIED REGISTERED

## 2019-07-19 PROCEDURE — 25000125 ZZHC RX 250: Performed by: NURSE ANESTHETIST, CERTIFIED REGISTERED

## 2019-07-19 PROCEDURE — 88305 TISSUE EXAM BY PATHOLOGIST: CPT | Mod: 26 | Performed by: INTERNAL MEDICINE

## 2019-07-19 PROCEDURE — 40000010 ZZH STATISTIC ANES STAT CODE-CRNA PER MINUTE: Performed by: INTERNAL MEDICINE

## 2019-07-19 PROCEDURE — 37000008 ZZH ANESTHESIA TECHNICAL FEE, 1ST 30 MIN: Performed by: INTERNAL MEDICINE

## 2019-07-19 PROCEDURE — 25000128 H RX IP 250 OP 636: Performed by: INTERNAL MEDICINE

## 2019-07-19 PROCEDURE — 25000128 H RX IP 250 OP 636: Performed by: NURSE ANESTHETIST, CERTIFIED REGISTERED

## 2019-07-19 PROCEDURE — 45385 COLONOSCOPY W/LESION REMOVAL: CPT | Performed by: INTERNAL MEDICINE

## 2019-07-19 RX ORDER — ALBUTEROL SULFATE 0.83 MG/ML
2.5 SOLUTION RESPIRATORY (INHALATION) EVERY 4 HOURS PRN
Status: DISCONTINUED | OUTPATIENT
Start: 2019-07-19 | End: 2019-07-19 | Stop reason: HOSPADM

## 2019-07-19 RX ORDER — MEPERIDINE HYDROCHLORIDE 25 MG/ML
12.5 INJECTION INTRAMUSCULAR; INTRAVENOUS; SUBCUTANEOUS
Status: DISCONTINUED | OUTPATIENT
Start: 2019-07-19 | End: 2019-07-19 | Stop reason: HOSPADM

## 2019-07-19 RX ORDER — HYDROMORPHONE HYDROCHLORIDE 1 MG/ML
.3-.5 INJECTION, SOLUTION INTRAMUSCULAR; INTRAVENOUS; SUBCUTANEOUS EVERY 10 MIN PRN
Status: DISCONTINUED | OUTPATIENT
Start: 2019-07-19 | End: 2019-07-19 | Stop reason: HOSPADM

## 2019-07-19 RX ORDER — FENTANYL CITRATE 50 UG/ML
25-50 INJECTION, SOLUTION INTRAMUSCULAR; INTRAVENOUS
Status: DISCONTINUED | OUTPATIENT
Start: 2019-07-19 | End: 2019-07-19 | Stop reason: HOSPADM

## 2019-07-19 RX ORDER — ONDANSETRON 2 MG/ML
4 INJECTION INTRAMUSCULAR; INTRAVENOUS EVERY 6 HOURS PRN
Status: DISCONTINUED | OUTPATIENT
Start: 2019-07-19 | End: 2019-07-19 | Stop reason: HOSPADM

## 2019-07-19 RX ORDER — FLUMAZENIL 0.1 MG/ML
0.2 INJECTION, SOLUTION INTRAVENOUS
Status: DISCONTINUED | OUTPATIENT
Start: 2019-07-19 | End: 2019-07-19 | Stop reason: HOSPADM

## 2019-07-19 RX ORDER — ONDANSETRON 4 MG/1
4 TABLET, ORALLY DISINTEGRATING ORAL EVERY 6 HOURS PRN
Status: DISCONTINUED | OUTPATIENT
Start: 2019-07-19 | End: 2019-07-19 | Stop reason: HOSPADM

## 2019-07-19 RX ORDER — NALOXONE HYDROCHLORIDE 0.4 MG/ML
.1-.4 INJECTION, SOLUTION INTRAMUSCULAR; INTRAVENOUS; SUBCUTANEOUS
Status: DISCONTINUED | OUTPATIENT
Start: 2019-07-19 | End: 2019-07-19 | Stop reason: HOSPADM

## 2019-07-19 RX ORDER — HYDRALAZINE HYDROCHLORIDE 20 MG/ML
2.5-5 INJECTION INTRAMUSCULAR; INTRAVENOUS EVERY 10 MIN PRN
Status: DISCONTINUED | OUTPATIENT
Start: 2019-07-19 | End: 2019-07-19 | Stop reason: HOSPADM

## 2019-07-19 RX ORDER — PROPOFOL 10 MG/ML
INJECTION, EMULSION INTRAVENOUS CONTINUOUS PRN
Status: DISCONTINUED | OUTPATIENT
Start: 2019-07-19 | End: 2019-07-19

## 2019-07-19 RX ORDER — ONDANSETRON 2 MG/ML
4 INJECTION INTRAMUSCULAR; INTRAVENOUS EVERY 30 MIN PRN
Status: DISCONTINUED | OUTPATIENT
Start: 2019-07-19 | End: 2019-07-19 | Stop reason: HOSPADM

## 2019-07-19 RX ORDER — DEXAMETHASONE SODIUM PHOSPHATE 4 MG/ML
INJECTION, SOLUTION INTRA-ARTICULAR; INTRALESIONAL; INTRAMUSCULAR; INTRAVENOUS; SOFT TISSUE PRN
Status: DISCONTINUED | OUTPATIENT
Start: 2019-07-19 | End: 2019-07-19

## 2019-07-19 RX ORDER — ONDANSETRON 2 MG/ML
4 INJECTION INTRAMUSCULAR; INTRAVENOUS
Status: COMPLETED | OUTPATIENT
Start: 2019-07-19 | End: 2019-07-19

## 2019-07-19 RX ORDER — SODIUM CHLORIDE, SODIUM LACTATE, POTASSIUM CHLORIDE, CALCIUM CHLORIDE 600; 310; 30; 20 MG/100ML; MG/100ML; MG/100ML; MG/100ML
INJECTION, SOLUTION INTRAVENOUS CONTINUOUS PRN
Status: DISCONTINUED | OUTPATIENT
Start: 2019-07-19 | End: 2019-07-19

## 2019-07-19 RX ORDER — LABETALOL 20 MG/4 ML (5 MG/ML) INTRAVENOUS SYRINGE
10
Status: DISCONTINUED | OUTPATIENT
Start: 2019-07-19 | End: 2019-07-19 | Stop reason: HOSPADM

## 2019-07-19 RX ORDER — PROPOFOL 10 MG/ML
INJECTION, EMULSION INTRAVENOUS PRN
Status: DISCONTINUED | OUTPATIENT
Start: 2019-07-19 | End: 2019-07-19

## 2019-07-19 RX ORDER — ONDANSETRON 4 MG/1
4 TABLET, ORALLY DISINTEGRATING ORAL EVERY 30 MIN PRN
Status: DISCONTINUED | OUTPATIENT
Start: 2019-07-19 | End: 2019-07-19 | Stop reason: HOSPADM

## 2019-07-19 RX ORDER — LIDOCAINE 40 MG/G
CREAM TOPICAL
Status: DISCONTINUED | OUTPATIENT
Start: 2019-07-19 | End: 2019-07-19 | Stop reason: HOSPADM

## 2019-07-19 RX ORDER — SODIUM CHLORIDE, SODIUM LACTATE, POTASSIUM CHLORIDE, CALCIUM CHLORIDE 600; 310; 30; 20 MG/100ML; MG/100ML; MG/100ML; MG/100ML
INJECTION, SOLUTION INTRAVENOUS CONTINUOUS
Status: DISCONTINUED | OUTPATIENT
Start: 2019-07-19 | End: 2019-07-19 | Stop reason: HOSPADM

## 2019-07-19 RX ORDER — LIDOCAINE HYDROCHLORIDE 20 MG/ML
INJECTION, SOLUTION INFILTRATION; PERINEURAL PRN
Status: DISCONTINUED | OUTPATIENT
Start: 2019-07-19 | End: 2019-07-19

## 2019-07-19 RX ADMIN — PROPOFOL 20 MG: 10 INJECTION, EMULSION INTRAVENOUS at 09:57

## 2019-07-19 RX ADMIN — DEXMEDETOMIDINE HYDROCHLORIDE 8 MCG: 100 INJECTION, SOLUTION INTRAVENOUS at 09:46

## 2019-07-19 RX ADMIN — LIDOCAINE HYDROCHLORIDE 60 MG: 20 INJECTION, SOLUTION INFILTRATION; PERINEURAL at 09:48

## 2019-07-19 RX ADMIN — PROPOFOL 50 MCG/KG/MIN: 10 INJECTION, EMULSION INTRAVENOUS at 09:48

## 2019-07-19 RX ADMIN — DEXMEDETOMIDINE HYDROCHLORIDE 8 MCG: 100 INJECTION, SOLUTION INTRAVENOUS at 09:55

## 2019-07-19 RX ADMIN — DEXAMETHASONE SODIUM PHOSPHATE 4 MG: 4 INJECTION, SOLUTION INTRA-ARTICULAR; INTRALESIONAL; INTRAMUSCULAR; INTRAVENOUS; SOFT TISSUE at 09:50

## 2019-07-19 RX ADMIN — BENZOCAINE 1 APPLICATOR: 200 GEL ORAL at 09:47

## 2019-07-19 RX ADMIN — PROPOFOL 10 MG: 10 INJECTION, EMULSION INTRAVENOUS at 10:12

## 2019-07-19 RX ADMIN — ONDANSETRON 4 MG: 2 INJECTION INTRAMUSCULAR; INTRAVENOUS at 09:48

## 2019-07-19 RX ADMIN — SODIUM CHLORIDE, POTASSIUM CHLORIDE, SODIUM LACTATE AND CALCIUM CHLORIDE: 600; 310; 30; 20 INJECTION, SOLUTION INTRAVENOUS at 09:45

## 2019-07-19 RX ADMIN — PROPOFOL 20 MG: 10 INJECTION, EMULSION INTRAVENOUS at 09:48

## 2019-07-19 ASSESSMENT — MIFFLIN-ST. JEOR: SCORE: 1983.13

## 2019-07-19 ASSESSMENT — ENCOUNTER SYMPTOMS: DYSRHYTHMIAS: 1

## 2019-07-19 NOTE — ANESTHESIA PREPROCEDURE EVALUATION
Anesthesia Pre-Procedure Evaluation    Patient: Du Travis   MRN: [id number] : 1935          Preoperative Diagnosis: ANEMIA    Procedure(s):  ESOPHAGOGASTRODUODENOSCOPY (EGD)  COLONOSCOPY    Past Medical History:   Diagnosis Date     Arthritis      Atrial fibrillation (H)     found on event monitor 14     CAD (coronary artery disease)      Erosive gastritis      Gastro-oesophageal reflux disease      Hypertension      Other and unspecified hyperlipidemia      Snores      Syncope 2014    On flight to [place] 2014     Past Surgical History:   Procedure Laterality Date     BIOPSY       CHOLECYSTECTOMY       COLONOSCOPY       CORONARY ANGIOGRAPHY ADULT ORDER  2012    diffuse Diag disease, Med management.     ORTHOPEDIC SURGERY      bilateral hip replaced, and right knee replaced     ORTHOPEDIC SURGERY      right knee replaced     PHACOEMULSIFICATION CLEAR CORNEA WITH STANDARD INTRAOCULAR LENS IMPLANT  2013    Procedure: PHACOEMULSIFICATION CLEAR CORNEA WITH STANDARD INTRAOCULAR LENS IMPLANT;  RIGHT PHACOEMULSIFICATION CLEAR CORNEA WITH STANDARD INTRAOCULAR LENS IMPLANT ;  Surgeon: Diana Edward MD;  Location: [facility]     PHACOEMULSIFICATION CLEAR CORNEA WITH STANDARD INTRAOCULAR LENS IMPLANT  2013    Procedure: PHACOEMULSIFICATION CLEAR CORNEA WITH STANDARD INTRAOCULAR LENS IMPLANT;  LEFT PHACOEMULSIFICATION CLEAR CORNEA WITH STANDARD INTRAOCULAR LENS IMPLANT ;  Surgeon: Diana Edward MD;  Location: [facility]       Anesthesia Evaluation     . Pt has had prior anesthetic.     No history of anesthetic complications          ROS/MED HX    ENT/Pulmonary:      (-) sleep apnea   Neurologic:       Cardiovascular:     (+) Dyslipidemia, hypertension--CAD, --. : . . . :. dysrhythmias a-fib, .       METS/Exercise Tolerance:     Hematologic:         Musculoskeletal:         GI/Hepatic:     (+) GERD       Renal/Genitourinary:         Endo:         Psychiatric:        "  Infectious Disease:         Malignancy:         Other:                                 Lab Results   Component Value Date    WBC 7.6 03/09/2018    HGB 12.8 (A) 03/09/2018    HCT 38.2 03/09/2018     03/09/2018    CRP 6.2 04/30/2007    SED 43 (H) 04/30/2007     04/22/2019    POTASSIUM 4.7 04/22/2019    CHLORIDE 108 (H) 04/22/2019    CO2 19 (L) 04/22/2019    BUN 26 04/22/2019    CR 1.70 (H) 04/22/2019     (H) 04/22/2019    SHAD 9.4 04/22/2019    ALBUMIN 3.9 03/09/2018    PROTTOTAL 6.3 03/09/2018    ALT <5 (L) 04/22/2019    AST 36 03/09/2018    ALKPHOS 80 03/09/2018    BILITOTAL 0.59 03/09/2018    PTT 29 12/17/2012    INR 0.96 12/17/2012    TSH 4.7100 03/09/2018    T4 1.10 03/09/2018       Preop Vitals  BP Readings from Last 3 Encounters:   07/19/19 146/68   04/22/19 130/70   01/11/18 142/73    Pulse Readings from Last 3 Encounters:   07/19/19 82   04/22/19 78   01/10/18 89      Resp Readings from Last 3 Encounters:   07/19/19 16   01/11/18 13   06/23/17 18    SpO2 Readings from Last 3 Encounters:   07/19/19 96%   01/11/18 93%   06/23/17 96%      Temp Readings from Last 1 Encounters:   01/10/18 36.8  C (98.3  F) (Oral)    Ht Readings from Last 1 Encounters:   07/19/19 1.905 m (6' 3\")      Wt Readings from Last 1 Encounters:   07/19/19 119.7 kg (264 lb)    Estimated body mass index is 33 kg/m  as calculated from the following:    Height as of this encounter: 1.905 m (6' 3\").    Weight as of this encounter: 119.7 kg (264 lb).       Anesthesia Plan      History & Physical Review  History and physical reviewed and following examination; no interval change.    ASA Status:  3 .    NPO Status:  > 8 hours    Plan for MAC Reason for MAC:  Deep or markedly invasive procedure (G8)  PONV prophylaxis:  Ondansetron (or other 5HT-3)       Postoperative Care      Consents  Anesthetic plan, risks, benefits and alternatives discussed with:  Patient..                 Blanca Becerra MD, MD  "

## 2019-07-19 NOTE — ANESTHESIA POSTPROCEDURE EVALUATION
Patient: Du Travis    Procedure(s):  ESOPHAGOGASTRODUODENOSCOPY, WITH BIOPSY  COLONOSCOPY, WITH HEMORRHAGE CONTROL    Diagnosis:ANEMIA  Diagnosis Additional Information: No value filed.    Anesthesia Type:  MAC    Note:  Anesthesia Post Evaluation    Patient location during evaluation: PACU  Patient participation: Able to fully participate in evaluation  Level of consciousness: awake  Pain management: adequate  Airway patency: patent  Cardiovascular status: acceptable  Respiratory status: acceptable  Hydration status: acceptable  PONV: none     Anesthetic complications: None          Last vitals:  Vitals:    07/19/19 1050 07/19/19 1100 07/19/19 1110   BP: 118/69 136/76 140/75   Pulse: 71 68 70   Resp: 19 17 11   SpO2: 95% 96% 96%         Electronically Signed By: Blanca Becerra MD, MD  July 19, 2019  12:02 PM

## 2019-07-19 NOTE — ANESTHESIA CARE TRANSFER NOTE
Patient: Du Travis    Procedure(s):  ESOPHAGOGASTRODUODENOSCOPY, WITH BIOPSY  COLONOSCOPY, WITH HEMORRHAGE CONTROL    Diagnosis: ANEMIA  Diagnosis Additional Information: No value filed.    Anesthesia Type:   MAC     Note:  Airway :Nasal Cannula  Patient transferred to:PACU  Handoff Report: Identifed the Patient, Identified the Reponsible Provider, Reviewed the pertinent medical history, Discussed the surgical course, Reviewed Intra-OP anesthesia mangement and issues during anesthesia, Set expectations for post-procedure period and Allowed opportunity for questions and acknowledgement of understanding      Vitals: (Last set prior to Anesthesia Care Transfer)    CRNA VITALS  7/19/2019 1007 - 7/19/2019 1043      7/19/2019             Resp Rate (set):  10                Electronically Signed By: SENG Bowers CRNA  July 19, 2019  10:43 AM

## 2019-07-22 LAB — COPATH REPORT: NORMAL

## 2020-04-10 ENCOUNTER — TELEPHONE (OUTPATIENT)
Dept: CARDIOLOGY | Facility: CLINIC | Age: 85
End: 2020-04-10

## 2020-04-11 NOTE — TELEPHONE ENCOUNTER
----- Message from Magui Ray sent at 4/8/2020  2:25 PM CDT -----  Regarding: Just ADAN Pike,    This pt is scheduled for his annual telephone visit with [facility] 4/15/20- please review if labs still needed and send to scheduling pool- pt states that he christofer labs in [facility] last month and would like to see if we can use those instead informed him I do not know the answer to that but someone will reach out to schedule the lab if [facility] still wants it.    Thanks,  Nickie

## 2020-04-11 NOTE — TELEPHONE ENCOUNTER
Attempted to call pt to get copy of labs done in [place], left message for pt to call back. Leigha CANCHOLA

## 2020-04-11 NOTE — TELEPHONE ENCOUNTER
Results  Lab Results        Date Name Specimen Result Interpretation Description Value Range Status Address    03/05/2020 Cbc W   WBC_I 9.80 3.60-10.00 Final [facility]: [address] [place], [place]      W   Nucleated RBC 0.00 % 0.00-2.00 % Final [facility]: [address], [place]      W Low  Rbc 3.70 M/uL 4.10-5.80 M/uL Final [facility]: [address], [place]      W Low  Hemoglobin 12.9 g/dL 13.2-17.0 g/dL Final [facility]: [address] [place], [place]      W   Hematocrit 38.60 % 37.00-51.00 % Final [facility]: [address] [place], [place]      W High  Mcv 104.2 fL 80.0-99.0 fL Final [facility]: [address], [place]      W High  Mch 34.9 pg 27.0-33.0 pg Final [facility]: [address] [place], [place]      W   Mchc 33.5 g/dL 32.0-36.0 g/dL Final [facility]: [address], [place]      W   Platelets 385 K/uL 140-440 K/uL Final [facility]: [address] [place], [place]      W   RDW_I 14.3 % 11.0-15.0 % Final [facility]: [address], [place]      W   Mpv 7.9 fL 7.4-10.4 fL Final [facility]: [address], [place]      W   Neutrophil, Percentage 57.6 % 40.0-75.0 % Final [facility]: [address] [place], [place]      W   Lymphocyte, Percentage 23.7 % 15.0-45.0 % Final [facility]: [address] [place], [place]      W High  Monocyte, Percentage 9.8 % 4.0-9.0 % Final [facility]: [address] [place], [place]      W High  Eosinophil, Percentage 7.1 % 1.0-6.0 % Final [facility]: [address] [place], [place]      W   Basophil, Percentage 1.8 % 0.0-2.0 % Final [facility]: [address] [place], [place]      W   Neutrophil, Absolute 5.7 K/uL 1.5-7.5 K/uL Final [facility]: [address], [place]      W   Lymphocyte, Absolute 2.3 K/uL 0.8-4.0 K/uL Final [facility]: [address] [place]       W   Monocyte, Absolute 1.0 K/uL 0.1-1.0 K/uL Final [facility]: [address] [place] [address] By, [place]      W   Eosinophil, Absolute 0.7 K/uL 0.1-1.0 K/uL Final [facility]: [address] [place] [address] By, [place]      W   Basophil, Absolute 0.2 K/uL 0.0-0.2 K/uL Final [facility]: [address] [place] [address] By, [place]    03/05/2020 Vitamin B12, Serum SERUM High  Vitamin B-12 >2000 pg/mL 232-1245 pg/mL Final [facility]: [address] [place] [address] By, [place]    03/05/2020 Folate, Serum SERUM   Folate >20.0 NG/mL  Final [facility]: [address] [place] [address] By, [place]    03/05/2020 T4, Free, Serum SERUM   T4, Free 1.33 NG/dL 0.93-1.70 NG/dL Final [facility]: [address] [place] [address] By, [place]    03/05/2020 TSH, Serum or Plasma SERUM   Tsh 3.5700 uIU/mL 0.2700-4.2000 uIU/mL Final [facility]: [address] [place] [address] By, [place]    03/05/2020 CMP, Serum or Plasma SERUM   Glucose 97 mg/dL  mg/dL Final [facility]: [address] [place] [address] [place], [place]      SERUM   Bun 16 mg/dL 7-25 mg/dL Final [facility]: [address] [place] [address] By, [place]      SERUM   Creatinine 1.1 mg/dL 0.6-1.3 mg/dL Final [facility]: [address] [place] [address] [place], [place]      SERUM   BUN/creatinine Ratio 14.95 calc 10.00-25.00 calc Final [facility]: [address] [place] [address] [place], [place]      SERUM   eGFR African American 85 mL/min/1.73m2 >60 mL/min/1.73m2 Final [facility]: [address] [place] [address] [place], [place]      SERUM   eGFR Non-african American 70 mL/min/1.73m2 >60 mL/min/1.73m2 Final [facility]: [address] [place], [place]      SERUM   Sodium 142 mmol/L 135-145 mmol/L Final [facility]: [address] [facility] [address], [place]      SERUM   Potassium 4.5 mmol/L 3.5-5.5 mmol/L Final [facility]: [address] [facility] [address], [place]      SERUM   Chloride 106 mmol/L 100-115 mmol/L Final [facility]: [address] [facility] [address], [place]      SERUM   Co2 27 mmol/L 21-33 mmol/L Final [facility]: [address] [place] [address] [place], [place]      SERUM   Anion Gap 13.8 calc 8.0-16.0 calc Final [facility]: [address] [place] [address] [place], [place]      SERUM   Calcium 9.2 mg/dL 8.8-10.6 mg/dL Final [facility]: [address] [place] [address] [place], [place]      SERUM   Total Protein 6.5 g/dL 6.2-8.6 g/dL Final [facility]: [address] [place] [address] [place], [place]      SERUM   Albumin 3.6 g/dL 3.5-5.7 g/dL Final [facility]: [address] [place] [address] [place], [place]      SERUM   Globulin 2.9 g/dL 1.3-4.0 g/dL Final [facility]: [address] [place] [address] [place], [place]      SERUM   A/g Ratio 1.2 calc 1.0-2.8 calc Final [facility]: [address] [place] [address] By[place], [place]      SERUM   Ast (Sgot) 28 U/L 13-39 U/L Final [facility]: [address] [place] [address] [place], [place]      SERUM   Alt (Sgpt) 16 U/L 7-52 U/L Final [facility]: [address] [place] [address] [place], [place]      SERUM   Alkaline Phosphatase 80 U/L  U/L Final [facility]: [address] [place] [address] By[place], [place]      SERUM Low  Total Bilirubin 0.29 mg/dL 0.30-1.00 mg/dL Final [facility]: [address] [place] [address] [place], [place]    03/05/2020 Ferritin, Serum or Plasma SERUM   Ferritin 57.8 NG/mL 16.0-243.0 NG/mL Final [facility]: [address] [place] [address] [place], [place]    03/05/2020 Iron + Total Iron-binding Capacity (TIBC), Serum SERUM Low  Serum Iron 40 ug/dL  ug/dL Final [facility]: [address] [place] [address] By[place], [place]      SERUM   Unsaturated Iron Binding Capacity 258 ug/dL  Final [facility]: [address] [place] [address] By[place], [place]      SERUM   Iron Binding Capacity 298 ug/dL 250-450 ug/dL Final [facility]: [address] [place] [address] [address], [place]      SERUM   Percent Iron Saturation 13.4 % 13.0-45.0 % Final [facility]: [address] [place], [place]    03/05/2020 Lipid Panel, Serum SERUM   Cholesterol 113 mg/dL  mg/dL Final [facility]: [address] [place], [place]      SERUM   Triglyceride 146 mg/dL  mg/dL Final [facility]: [facility] [address] [place], [place]      SERUM   HDL Cholesterol 28 mg/dL 23-92 mg/dL Final [facility]: [facility] [address] [place], [place]      SERUM   chol/HDL Risk Ratio 4 calc  Final [facility]: [address] [address] [address], [place]      SERUM   non-HDL Cholesterol 85 mg/dL  Final [facility]: [facility] [address] [place], [place]      SERUM   LDL-calculated 56 mg/dL  Final [facility]: [facility] [address] [place], [place]      SERUM   VLDL Cholesterol 29.20 mg/dL  Final [facility]: [address] [place] [address] By, [place]    03/05/2020 Uric Acid, Serum or Plasma SERUM   Uric Acid 5.4 mg/dL 4.4-7.6 mg/dL Final [facility]: [address] [place] [address] [place], [place]    03/05/2020 Venipuncture NOT SPECIFIED   Results   Final [facility]: [address] [place] [address] By[place], [place]    10/24/2019 Vitamin B12, Serum SERUM High  Vitamin B-12 1492 pg/mL 232-1245 pg/mL Final [facility]: [address] [place] [address] By, [place]    10/24/2019 Folate, Serum SERUM High  Folate >20 NG/mL 3.1-17.5 NG/mL Final [facility]: [address] [place] [address] By, [place]    10/24/2019 T4, Free, Serum SERUM   T4, Free 0.99 NG/dL 0.82-1.74 NG/dL Final [facility]: [address] [place] [address] [place], [place]    10/24/2019 TSH, Serum or Plasma SERUM   Tsh 3.0400 mIU/mL 0.5000-5.0000 mIU/mL Final [facility]: [address] [place] [address] [place], [place]    10/24/2019 Cbc WHOLE BLD   Wbc 6.6 K/uL 3.6-10.0 K/uL Final [facility]: [address] [place] [address] Byp, [place]      WHOLE BLD Low  Rbc 3.68 M/uL 4.10-5.80 M/uL Final [facility]: [address] [place] [address] [place], [place]      WHOLE BLD Low  Hgb 12.9 g/dL 13.2-17.0 g/dL Final [facility]: [address] [place], [place]      WHOLE BLD   Hematocrit 37.30 % 37.00-51.00 % Final [facility]: [address] [place], [place]      WHOLE BLD High  Mcv 101.5 fL 80.0-99.0 fL Final [facility]: [address] [place], [place]      WHOLE BLD High  Mch 35.1 pg 27.0-33.0 pg Final [facility]: [facility], [place]      WHOLE BLD   Mchc 34.6 g/dL 32.0-36.0 g/dL Final [facility]: [facility] [place], [place]      WHOLE BLD   Platelets 280 K/uL 140-440 K/uL Final [facility]: [address] [place] [address] [address], [place]      WHOLE BLD High  Rdw 15.7 % 11.0-15.0 % Final [facility]: [address] [place] [address] [address], [place]      WHOLE BLD   Mpv 7.6 fL 7.4-10.4 fL Final [facility]: [address] [place] [address] [address], [place]      WHOLE BLD   Neutrophil, % 50.1 % 40.0-75.0 % Final [facility]: [address] [place] [address] [address], [place]      WHOLE BLD   Lymphocyte % 28.5 % 20.5-51.5 % Final [facility]: [address] [place] [address] [address], [place]      WHOLE BLD High  Monocyte % 12.2 % 4.0-9.0 % Final [facility]: [address] [place] [address] [address], [place]      WHOLE BLD High  Eosinophil % 8.8 % 1.0-6.0 % Final [facility]: [address] [place] [address] [address], [place]      WHOLE BLD   Basophil % 0.4 % 0.0-2.0 % Final [facility]: [address] [place] [address] [address], [place]      WHOLE BLD   Neutrophil, # 3.3 K/uL 1.5-7.5 K/uL Final [facility]: [address] [place] [address] [address], [place]      WHOLE BLD   Lymphocyte # 1.9 K/uL 0.8-4.0 K/uL Final [facility]: [address] [place] [address] [address], [place]      WHOLE BLD   Monocyte # 0.8 K/uL 0.1-1.0 K/uL Final [facility]: [address] [place] [address] [address], [place]      WHOLE BLD   Eosinophil # 0.6 K/uL 0.1-1.0 K/uL Final [facility]: [address] [place] [address] [address], [place]      WHOLE BLD   Basophil # 0.0 K/uL 0.0-0.2 K/uL Final [facility]: [address] [facility] [address], [place]    10/24/2019 CMP, Serum or Plasma SERUM   Glucose 86 mg/dL  mg/dL Final [facility]: [address] [facility] [address], [place]      SERUM   Bun 21 mg/dL 7-25 mg/dL Final [facility]: [address] [address] [address], [place]      SERUM   Creatinine 1.3 mg/dL 0.6-1.3 mg/dL Final [facility]: [address] [facility] [address], [place]      SERUM   BUN/creatinine Ratio 16.67 calc 10.00-25.00 calc Final [facility]  [facility]: [address] [facility] [address], [place]      SERUM   eGFR African American 71 mL/min/1.73m2 >60 mL/min/1.73m2 Final [facility]: [address] [facility] [address], [place]      SERUM Low  eGFR Non-african American 58 mL/min/1.73m2 >60 mL/min/1.73m2 Final [facility]: [address] [facility] [address], [place]      SERUM   Sodium 140 mmol/L 135-145 mmol/L Final [facility]: [address] [facility] [address], [place]      SERUM   Potassium 4.2 mmol/L 3.5-5.5 mmol/L Final [facility]: [address] [facility] [address], [place]      SERUM   Chloride 105 mmol/L 100-115 mmol/L Final [facility]: Counts include 234 beds at the [facility] [address], [place]      SERUM   Co2 23 mmol/L 21-33 mmol/L Final [facility]: Counts include 234 beds at the [facility] [address], [place]      SERUM   Anion Gap 15.8 calc 8.0-16.0 calc Final [facility]: [address] [facility] [address], [place]      SERUM   Calcium 9.0 mg/dL 8.8-10.6 mg/dL Final [facility]: [address] [place] [address] [address], [place]      SERUM   Total Protein 6.6 g/dL 6.2-8.6 g/dL Final [facility]: [address] [place] [address] [place], [place]      SERUM   Albumin 3.6 g/dL 3.5-5.7 g/dL Final [facility]: [address] [place] [address] [place], [place]      SERUM   Globulin 3.0 g/dL 1.3-4.0 g/dL Final [facility]: [address] [place] [address] [address], [place]      SERUM   A/g Ratio 1.2 calc 1.0-2.8 calc Final [facility]: Counts include 234 beds at the [facility] [address], [place]      SERUM   Ast (Sgot) 38 U/L 13-39 U/L Final [facility]: [address] [place] [address] By[address], [place]      SERUM   Alt (Sgpt) 23 U/L 7-52 U/L Final [facility]: Counts include 234 beds at the [facility] [place] [address] [address], [place]      SERUM   Alkaline Phosphatase 97 U/L  U/L [facility] [address] [facility] [address], [place]      SERUM   Total Bilirubin 0.38 mg/dL 0.30-1.00 mg/dL Final [facility] [address] [facility] [address] By, [place]    10/24/2019 Ferritin, Serum or Plasma SERUM   Ferritin 44.0 NG/mL 16.0-243.0 NG/mL Final [facility]: [address] [facility] [address] By, [place]    10/24/2019 Iron + Total Iron-binding Capacity  (TIBC), Serum SERUM   Serum Iron 73 ug/dL  ug/dL Final [facility]: [facility] [address], [place]      SERUM   Unsaturated Iron Binding Capacity 248 ug/dL  Final [facility]: [address] [place] [address] 41 [place], [place]      SERUM   Iron Binding Capacity 321 ug/dL 250-450 ug/dL Final [facility]: [address] [place] [address] By[place], [place]      SERUM   Percent Iron Saturation 22.7 % 13.0-45.0 % Final [facility]: [address] [place] [address] Byp, [place]    10/24/2019 Lipid Panel, Serum SERUM   Cholesterol 161 mg/dL  mg/dL Final [facility]: [address] [place] [address] By[place], [place]      SERUM   Triglyceride 139 mg/dL  mg/dL Final [facility]: [address] [place] [address] [place], [place]      SERUM   HDL Cholesterol 57 mg/dL 23-92 mg/dL Final [facility]: [address] [place] [address] By[place], [place]      SERUM   VLDL Cholesterol 27.80 mg/dL  Final [facility]: [address] [place] [address] By[place], [place]      SERUM   chol/HDL Risk Ratio 3 calc  Final [facility]: [address] [place] [address] [place], [place]      SERUM   non-HDL Cholesterol 104 mg/dL  Final [facility]: [address] [place] [address] By[place], [place]      SERUM   LDL-calculated 76 mg/dL  Final [facility]: [address] [place] [address] [place], [place]    10/24/2019 Uric Acid, Serum or Plasma SERUM Low  Uric Acid 3.9 mg/dL 4.4-7.6 mg/dL Final [facility]: [address] [place] [address] [address], [place]    10/24/2019 Urinalysis, Complete URINE   Color yellow yellow Final [facility]: [address] [place] [address] Byp, [place]      URINE   Appearance clear clear Final [facility]: [address] [place] [address] By[place], [place]      URINE   Specific Gravity 1.009 1.005-1.030 Final [facility]: [address] [place] [address] [address], [place]      URINE   Ph 5.50 5.00-8.00 Final [facility]: [address] [place] [address] Byp, [place]      URINE   Glucose negative mg/dL negative mg/dL Final [facility] Services: [address] [place]      URINE   Bilirubin negative mg/dL negative mg/dL Final [facility]: [address] [facility] [address], [place]      URINE   Ketone negative mg/dL negative mg/dL Final [facility]: [address] [facility] [address], [place]      URINE   Blood negative mg/dL negative mg/dL Final [facility]: [address] [facility] [address], [place]      URINE ABNORMAL  Protein 30.00 mg/dL negative mg/dL Final [facility]: [address] [facility] [address] By[address], [place]      URINE   Urobilinogen normal E.U./dL normal E.U./dL Final [facility]: [address] [facility] [address], [place]      URINE   Nitrite negative negative Final [facility]: [address] [facility] [address], [place]      URINE ABNORMAL  Leukocytes moderate roman/uL negative roman/uL Final [facility]: [address] [facility] [address] [place], [place]    10/24/2019 Urinalysis, Microscopic URINE High  Uwbc 14 #/hpf 0-3 #/hpf Final [facility]: [address] [facility] [address] [place], [place]      URINE   Urbc 2 #/hpf 0-3 #/hpf Final [facility]: [address] [facility] [address] [place], [place]      URINE   Epithelial Cells 2 /hpf 0-10 /hpf Final [facility]: [address] [facility] [address] [place], [place]      URINE   Bacteria negative /uL none seen /uL Final [facility]: [address] [place] [address] 41 By[address], [place]    10/24/2019 Culture, Urine URINE   No observation recorded.    [facility]: [address] [place] [address] [address], [place]    10/24/2019 Venipuncture NOT SPECIFIED   Results   Final [facility]: [address] [facility] [address], [place]    03/13/2019 Vitamin B12, Serum SERUM High  Vitamin B-12 >2000 pg/mL 232-1245 pg/mL Final [facility]: [address] [facility] [address], [place]    03/13/2019 Folate, Serum SERUM   Folate 17.3 NG/mL 3.1-17.5 NG/mL Final [facility]: [address] [facility] [address], [place]    03/13/2019 Cbc WHOLE BLD   Wbc 10.2 K/uL 4.4-11.0 K/uL Final [facility]: [address], [place]      WHOLE BLD Low  Rbc 3.53 M/uL 4.50-5.90 M/uL Final [facility]: [address] [facility] [place]      WHOLE BLD Low  Hgb 10.1 g/dL 13.8-17.0 g/dL Final [facility]: [address] [facility]  [address], [place]      WHOLE BLD Low  Hct 31.7 % 41.5-50.4 % Final [facility]: [facility] [place], [place]      WHOLE BLD   Mcv 89.9 fL 80.0-96.0 fL Final [facility]: [address], [place]      WHOLE BLD   Mch 28.6 pg 27.0-33.2 pg Final [facility]: [facility]  [address], [place]      WHOLE BLD Low  Mchc 31.8 g/dL 32.0-36.0 g/dL Final [facility]: [facility]  [address], [place]      WHOLE BLD   Platelets 423 K/uL 143-440 K/uL Final [facility]: [facility], [place]      WHOLE BLD High  Rdw 22.1 % 11.8-15.6 % Final [facility]: [facility]  [address], [place]      WHOLE BLD   Mpv 7.9 fL 7.4-10.4 fL Final [facility]: [address] [place] [address] [address], [place]      WHOLE BLD   Neutrophil, Percentage 58.8 % 42.2-75.2 % Final [facility]: [address] [place] [address] [address], [place]      WHOLE BLD   Lymphocyte, Percentage 24.7 % 20.5-51.5 % Final [facility]: [address] [place] [address] [address] [place], [place]      WHOLE BLD High  Monocyte, Percentage * 9.9 % 1.5-9.0 % Final [facility]: [address] [place] [address] [address], [place]      WHOLE BLD   Eosinophil, Percentage 5.5 % 1.0-6.0 % Final [facility]: [address] [place] [address] [address], [place]      WHOLE BLD   Basophil, Percentage 1.1 % 0.0-4.0 % Final [facility]: [facility]  [address], [place]      WHOLE BLD   Lymphocyte, Absolute 2.5 K/uL 0.7-4.9 K/uL Final [facility]: [address] [place] [address] [address], [place]      WHOLE BLD High  Monocyte, Absolute 1.0 K/uL 0.1-0.9 K/uL Final [facility]: [address] [place] [address] [address], [place]      WHOLE BLD   Neutrophil, Absolute 6.0 K/uL 1.5-7.2 K/uL Final [facility]: [address], [place]      WHOLE BLD High  Eosinophil, Absolute 0.6 K/uL 0.0-0.4 K/uL [facility] [address] [facility] [address], [place]      WHOLE BLD   Basophil, Absolute 0.1 K/uL 0.0-0.2 K/uL [facility] [address], [place]    03/13/2019 Ferritin, Serum or Plasma SERUM    Ferritin 33.1 NG/mL 16.0-243.0 NG/mL Final [facility]: [address] [place] [address] By, [place]    03/13/2019 Iron + Total Iron-binding Capacity (TIBC), Serum SERUM Low  Serum Iron 27 ug/dL  ug/dL Final [facility]: [address] [place] [address] By, [place]      SERUM   Unsaturated Iron Binding Capacity 294 ug/dL  Final [facility]: [address] [place] [address] By, [place]      SERUM   Iron Binding Capacity 321 ug/dL 250-450 ug/dL Final [facility]: [address] [place] [address] By, [place]      SERUM Low  Percent Iron Saturation 8.4 % 13.0-45.0 % Final [facility]: [address] [place] [address] By, [place]    03/13/2019 Venipuncture NOT SPECIFIED   Results   Final [facility]: [address] [place] [address] By, [place]    02/06/2019 Vitamin B12, Serum S High  Vitamin B-12 >2000 pg/mL 232-1245 pg/mL Final [facility]: [address] [place] [address] By, [place]    02/06/2019 Folate, Serum S   Folate 13.7 NG/mL 3.1-17.5 NG/mL Final [facility]: [address] [place] [address] By, [place]    02/06/2019 T4, Free, Serum S   T4, Free 1.28 NG/dL 0.82-1.74 NG/dL Final [facility]: [address] [place] [address] By, [place]    02/06/2019 Testosterone, Total, Serum S Low  Testosterone 154.3 NG/dL 240.0-847.0 NG/dL Final [facility]: [address] [place] [address] By, [place]    02/06/2019 TSH, Serum or Plasma S   Tsh 4.0600 mIU/mL 0.5000-5.0000 mIU/mL Final [facility]: [address] [place] [address] By, [place]    02/06/2019 Cbc W High  Wbc 12.8 K/uL 4.4-11.0 K/uL Final [facility]: [address] [place] [address] By, [place]      W Low  Rbc 2.85 M/uL 4.50-5.90 M/uL Final [facility]: [address] [place], [place]      W Low  Hgb 8.8 g/dL 13.8-17.0 g/dL Final [facility]: [address], [place]      W Low  Hct 26.8 % 41.5-50.4 % Final [facility]: [address], [place]      W   Mcv 93.9 fL 80.0-96.0 fL Final [facility]: [address] [place], [place]      W   Mch 30.9 pg 27.0-33.2 pg Final [facility]: [address] [place], [place]      W   Mchc 32.9 g/dL 32.0-36.0 g/dL Final [facility]: [address] [place], [place]      W High  Platelets 467 K/uL 143-440 K/uL Final [facility]: [address] By[address], [place]      W High  Rdw 19.0 % 11.8-15.6 % Final [facility]: [address] By[address], [place]      W Low  Mpv 7.3 fL 7.4-10.4 fL Final [facility]: [address] [place], [place]      W   Neutrophil, Percentage 63.1 % 42.2-75.2 % Final [facility]: [address] By[address], [place]      W   Lymphocyte, Percentage 20.5 % 20.5-51.5 % Final [facility]: [facility], [place]      W High  Monocyte, Percentage * 9.6 % 1.5-9.0 % Final [facility]: [facility]  [address], [place]      W   Eosinophil, Percentage 6.0 % 1.0-6.0 % Final [facility]: [facility]  [address], [place]      W   Basophil, Percentage 0.8 % 0.0-4.0 % Final [facility]: [facility]  [address], [place]      W   Lymphocyte, Absolute 2.6 K/uL 0.7-4.9 K/uL Final [facility]: [facility]  [address], [place]      W High  Monocyte, Absolute 1.2 K/uL 0.1-0.9 K/uL Final [facility]: [facility] [address], [place]      W High  Neutrophil, Absolute 8.1 K/uL 1.5-7.2 K/uL Final [facility]: [address], [place]      W High  Eosinophil, Absolute 0.8 K/uL 0.0-0.4 K/uL Final [facility]: [facility]  [address], [place]      W   Basophil, Absolute 0.1 K/uL 0.0-0.2 K/uL Final [facility]: [facility] [address] [place]    02/06/2019 CMP, Serum or Plasma S High  Glucose 102 mg/dL  mg/dL Final [facility]: [address] [facility] [address] [place]   Bun 17 mg/dL 7-25 mg/dL [facility]: [address] [place] [address] 41 [place]   Creatinine 1.2 mg/dL 0.6-1.3 mg/dL Final [facility]: [address] [place] [address] 41 [place]   BUN/creatinine Ratio 14.05 calc 10.00-25.00 calc Final [facility]: [facility] [place] [address]  [place], [place]      S   eGFR African American 74 mL/min/1.73m2 >60 mL/min/1.73m2 Final [facility]: [address], [place]      S   eGFR Non-african American 61 mL/min/1.73m2 >60 mL/min/1.73m2 Final [facility]: [address], [place]      S   Sodium 137 mmol/L 135-145 mmol/L Final [facility]: [address], [place]      S   Potassium 4.6 mmol/L 3.5-5.5 mmol/L Final [facility]: [address] [facility] [address], [place]      S   Chloride 101 mmol/L 100-115 mmol/L Final [facility]: [address], [place]      S   Co2 26 mmol/L 21-33 mmol/L Final [facility]: [address] [facility] [address], [place]      S   Anion Gap 14.7 calc 8.0-16.0 calc Final [facility]: [address] [facility] [address], [place]      S   Calcium 9.3 mg/dL 8.8-10.6 mg/dL Final [facility]: [address] [facility] [address], [place]      S   Total Protein 6.3 g/dL 6.2-8.6 g/dL Final [facility]: [address] [facility] [address], [place]      S Low  Albumin 3.4 g/dL 3.5-5.7 g/dL Final [facility]: [address] [facility] [address], [place]      S   Globulin 2.9 g/dL 1.3-4.0 g/dL Final [facility]: [address] [facility] [address], [place]      S   A/g Ratio 1.2 calc 1.0-2.8 calc Final [facility]: [address] [facility] [address], [place]      S   Ast (Sgot) 21 U/L 13-39 U/L Final [facility]: [facility] [facility] [address], [place]      S   Alt (Sgpt) 16 U/L 7-52 U/L Final [facility]: [address] [facility] [address], [place]      S   Alkaline Phosphatase 84 U/L  U/L Final [facility]: 1287 [facility] [address], [place]      S Low  Total Bilirubin 0.21 mg/dL 0.30-1.00 mg/dL Final [facility]: 1287 [facility] [address], [place]    02/06/2019 Ferritin, Serum or Plasma S   Ferritin 17.6 NG/mL 16.0-243.0 NG/mL Final [facility]: [address] [place] [address] 41 [address], [place]    02/06/2019 Iron + Total Iron-binding Capacity (TIBC), Serum S Low  Serum Iron 16 ug/dL  ug/dL Final [facility]: 128  [facility] [address], [place]      S   Unsaturated Iron Binding Capacity 324 ug/dL  Final [facility]: [address]87 [facility] [address], [place]      S   Iron Binding Capacity 340 ug/dL 250-450 ug/dL Final [facility]: [address] [facility] [address], [place]      S Low  Percent Iron Saturation 4.7 % 13.0-45.0 % Final [facility]: [address] [facility] [address], [place]    02/06/2019 Lipid Panel, Serum S   Cholesterol 106 mg/dL  mg/dL Final [facility]: [address] [facility] [address], [place]      S   Triglyceride 150 mg/dL  mg/dL Final [facility]: [address] [facility] [address], [place]      S   HDL Cholesterol 26 mg/dL 23-92 mg/dL Final [facility]: [address] [facility] [address], [place]      S   VLDL Cholesterol 30.00 mg/dL  Final [facility]: 1287 [facility] [address], [place]      S   chol/HDL Risk Ratio 4 calc  Final [facility]: [address] [facility] [address], [place]      S   non-HDL Cholesterol 80 mg/dL  Final [facility]: [address] [facility] [address], [place]      S   LDL-calculated 50 mg/dL  Final [facility]: [address] [place] [address] [address], [place]    02/06/2019 Uric Acid, Serum or Plasma S   Uric Acid 6.0 mg/dL 4.4-7.6 mg/dL Final [facility]: [address] [facility] [address], [place]    02/06/2019 Urinalysis, Complete U   Color light yellow yellow Final [facility]: [address]87 [facility] [address], [place]      U   Appearance clear clear Final [facility]: 1287 [facility] [address], [place]      U   Specific Gravity 1.006 1.001-1.040 Final [facility]: [address] [facility] [address], [place]      U   Ph 7.00 5.00-9.00 Final [facility]: [address] [facility] [address], [place]      U   Glucose negative mg/dL negative mg/dL Final [facility]: 1287 [facility] [address], [place]      U   Bilirubin negative mg/dL negative mg/dL Final [facility]: [address], [place]      U   Ketone negative mg/dL negative mg/dL Final [facility]: [address], [place]      U   Blood  negative mg/dL negative mg/dL Final [facility]: [facility] [address], [place]      U   Protein negative mg/dL negative mg/dL Final [facility]: [address] [facility] [address], [place]      U   Urobilinogen normal E.U./dL normal E.U./dL Final [facility]: [facility] [facility] [address], [place]      U   Nitrite negative negative Final [facility]: [facility] [facility] [address], [place]      U   Leukocytes negative roman/uL negative roman/uL Final [facility]: [address] [facility] [address], [place]    02/06/2019 Venipuncture A   Results   Final [facility]: [address] [facility] [address], [place]    03/09/2018 Urinalysis, Complete U   Color yellow yellow Final [facility]: [address] [facility] [address], [place]      U   Appearance clear clear Final [facility]: [facility] [facility] [address], [place]      U   Specific Gravity 1.007 1.001-1.040 Final [facility]: [address] [facility] [address], [place]      U   Ph 7.00 5.00-9.00 Final [facility]: [address] [facility] [address], [place]      U   Glucose negative mg/dL negative mg/dL Final [facility]: [facility] [facility] [address], [place]      U   Bilirubin negative mg/dL negative mg/dL Final [facility]: [address] [facility] [address], [place]      U   Ketone negative mg/dL negative mg/dL Final [facility]: [address] [facility] [address], [place]      U   Blood negative mg/dL negative mg/dL Final [facility]: [address] [facility] [address], [place]      U   Protein trace mg/dL negative mg/dL Final [facility]: [address] [facility] [address], [place]      U   Urobilinogen normal E.U./dL normal E.U./dL Final [facility]: [address] [facility] [address], [place]      U   Nitrite negative negative Final [facility]: [address] [facility] [address], [place]      U   Leukocytes negative roman/uL negative roman/uL Final [facility]: [address] [facility] [address], [place]    03/09/2018 HbA1C (Hemoglobin a1C), Blood W   Hemoglobin a1C 5.3 % 4.3-5.6 % Final [facility]: 12[address]  [facility] [address], [place]      W   Estimated Average Glucose 105.4 mg/dL 97.0-140.0 mg/dL Final [facility]: [address] [place] [address] [address], [place]    03/09/2018 Vitamin B12, Serum S High  Vitamin B-12 1690 pg/mL 232-1245 pg/mL Final [facility]: [facility] [address], [place]    03/09/2018 Folate, Serum S High  Folate >20 NG/mL  Final [facility]: [address] [place] [address] [address], [place]    03/09/2018 T4, Free, Serum S   T4, Free 1.10 NG/dL 0.82-1.74 NG/dL Final [facility]: [address] [place] [address] [address], [place]    03/09/2018 TSH, Serum or Plasma S   Tsh 4.7100 mIU/mL 0.5000-5.0000 mIU/mL Final [facility]: [address] [place] [address] 41 [address], [place]    03/09/2018 Cbc W   Wbc 7.6 K/uL 4.4-11.0 K/uL Final [facility]: [address] [place] [address] [address], [place]      W Low  Rbc 3.77 M/uL 4.50-5.90 M/uL Final [facility]: [address] [place] [address] [address], [place]      W Low  Hgb 12.8 g/dL 13.8-17.0 g/dL Final [facility]: [address] [place] [address] [address], [place]      W Low  Hct 38.2 % 41.5-50.4 % Final [facility]: [address] [place] [address] [address], [place]      W High  Mcv 101.5 fL 80.0-96.0 fL Final [facility]: [facility]7 [facility] [address], [place]      W High  Mch 34.1 pg 27.0-33.2 pg Final [facility]: [facility]7 [facility] [address], [place]      W   Mchc 33.6 g/dL 32.0-36.0 g/dL Final [facility]: [facility] [address], [place]      W   Platelets 279 K/uL 143-440 K/uL Final [facility]: [facility] [address], [place]      W High  Rdw 16.0 % 11.8-15.6 % Final [facility]: [address], [place]      W   Mpv 7.6 fL 7.4-10.4 fL Final [facility]: [address], [place]      W   Neutrophil, Percentage 51.6 % 42.2-75.2 % Final [facility]: [address], [place]      W   Lymphocyte, Percentage 29.2 % 20.5-51.5 % Final [facility]: [address], [place]      W High  Monocyte, Percentage 12.6 % 1.5-9.0 % Final [facility]: [address],  [place]      W   Eosinophil, Percentage 5.4 % 1.0-6.0 % Final [facility]: [address], [place]      W   Basophil, Percentage 1.2 % 0.0-4.0 % Final [facility]: [address], [place]      W   Lymphocyte, Absolute 2.2 K/uL 0.7-4.9 K/uL Final [facility]: [address], [place]      W High  Monocyte, Absolute 1.0 K/uL 0.1-0.9 K/uL Final [facility]: [address], [place]      W   Neutrophil, Absolute 3.9 K/uL 1.5-7.2 K/uL Final [facility]: [address], [place]      W   Eosinophil, Absolute 0.4 K/uL 0.0-0.4 K/uL Final [facility]: [address], [place]      W   Basophil, Absolute 0.1 K/uL 0.0-0.2 K/uL Final [facility]: [address], [place]    03/09/2018 CMP, Serum or Plasma S   Glucose 91 mg/dL  mg/dL Final [facility]: [address], [place]      S   Bun 15 mg/dL 7-25 mg/dL Final [facility]: [address], [place]      S   Creatinine 1.2 mg/dL 0.6-1.3 mg/dL Final [facility]: [address], [place]      S   BUN/creatinine Ratio 12.50 calc 10.00-25.00 calc Final [facility]: [address], [place]      S   eGFR African American 75 mL/min/1.73m2 >60 mL/min/1.73m2 Final [facility]: [address], [place]      S   eGFR Non-african American 62 mL/min/1.73m2 >60 mL/min/1.73m2 Final [facility]: [address], [place]      S   Sodium 141 mmol/L 135-145 mmol/L Final [facility]: [address], [place]      S   Potassium 4.8 mmol/L 3.5-5.5 mmol/L Final [facility]: [address], [place]      S   Chloride 104 mmol/L 100-115 mmol/L Final [facility]: [address], [place]      S   Co2 28 mmol/L 21-33 mmol/L Final [facility]: [address], [place]      S   Anion Gap 14.0 calc 8.0-16.0 calc Final [facility]: [address],  [place]      S   Calcium 9.6 mg/dL 8.8-10.6 mg/dL Final [facility]: [address] [place] [address] [address], [place]      S   Total Protein 6.3 g/dL 6.2-8.6 g/dL Final [facility]: [address] [place] [address] [address], [place]      S   Albumin 3.9 g/dL 3.5-5.7 g/dL Final [facility]: [address] [place] [address] [address], [place]      S   Globulin 2.4 g/dL 1.3-4.0 g/dL Final [facility]: [address] [place] [address] [address], [place]      S   A/g Ratio 1.6 calc 1.0-2.8 calc Final [facility]: [address] [place] [address] [address], [place]      S   Ast (Sgot) 36 U/L 13-39 U/L Final [facility]: [address] [place] [address] [address], [place]      S   Alt (Sgpt) 17 U/L 7-52 U/L Final [facility]: [address] [place] [address] [address], [place]      S   Alkaline Phosphatase 80 U/L  U/L Final [facility]: [address] [place] [address] [address], [place]      S   Total Bilirubin 0.59 mg/dL 0.30-1.00 mg/dL Final [facility]: [address] [place] [address] [address], [place]    03/09/2018 Ferritin, Serum or Plasma S   Ferritin 134.1 NG/mL 16.0-243.0 NG/mL Final [facility]: [address] [place] [address] [address], [place]    03/09/2018 Iron + Total Iron-binding Capacity (TIBC), Serum S   Serum Iron 126 ug/dL  ug/dL Final [facility]: [address] [place] [address] [address], [place]      S   Unsaturated Iron Binding Capacity 187 ug/dL  Final [facility]: [address] [place] [address] [address], [place]      S   Iron Binding Capacity 313 ug/dL 250-450 ug/dL Final [facility]: [address] [place] [address] [address], [place]      S   Percent Iron Saturation 40.3 % 13.0-45.0 % Final [facility]: [address] [place] [address] [address], [place]    03/09/2018 Lipid Panel, Serum S   Cholesterol 132 mg/dL  mg/dL Final [facility]: [address] [place]   Triglyceride 136 mg/dL  mg/dL Final [facility]: [address], [place]   HDL Cholesterol 41 mg/dL 23-92 mg/dL Final [facility]: [address]   VLDL Cholesterol 27.20 mg/dL  Final [facility]:  [address], [place]      S   chol/HDL Risk Ratio 3 calc  Final [facility]: [address], [place]      S   non-HDL Cholesterol 91 mg/dL  Final [facility]: [address], [place]      S   LDL-calculated 64 mg/dL  Final [facility]: [address], [place]    03/09/2018 Uric Acid, Serum or Plasma S Low  Uric Acid 3.7 mg/dL 4.4-7.6 mg/dL Final [facility]: [address], [place]    03/09/2018 Venipuncture A   Results   Final [facility]: [address], [place]    02/15/2017 Urinalysis, Complete U   Color light yellow  Final [facility]: [address], [place]      U   Appearance clear clear Final [facility]: [address], [place]      U   Specific [place] 1.011  Final [facility]: [address], [place]      U   Ph 6.0  Final [facility]: [address], [place]      U   Glucose negative negative Final [facility]: [address], [place]      U   Bilirubin negative negative Final [facility]: [address], [place]      U   Ketone negative negative Final [facility]: [address], [place]      U   Blood negative negative Final [facility]: [address], [place]      U   Protein negative negative Final [facility]: [address], [place]      U   Urobilinogen normal  Final [facility]: [address], [place]      U   Nitrite negative negative Final [facility]: [address], [place]      U   Leukocytes negative negative Final [facility]: [address], [place]    02/15/2017 Vitamin B12, Serum S High  Vitamin B-12 2000 pg/mL 211-946 pg/mL Final [facility]: [address], [place]    02/15/2017 Folate, Serum S   Folate 17.0 NG/mL 3.1-17.5 NG/mL Final [facility]: [facility] [place]    02/15/2017 T4, Free,  Serum S   FT4 (Free Thyroxine) 1.20 NG/dL 0.82-1.74 NG/dL Final [facility]: [address] [place], [place] [address]y [address], [place]    02/15/2017 TSH, Serum or Plasma S   Tsh 3.570 mIU/mL 0.500-5.000 mIU/mL Final [facility]: [address] [place], [place] [address]y [address], [place]    02/15/2017 Cbc WHOLE BLOOD   Wbc 10.6 x10^3/uL 4.4-11.0 x10^3/uL Final [facility]: [facility] [address], [place]      WHOLE BLOOD Low  Rbc 3.99 x10^6/uL 4.50-5.90 x10^6/uL Final [facility]: [facility], [place]      WHOLE BLOOD Low  Hgb 13.4 g/dL 14.0-17.2 g/dL Final [facility]: [address] [place], [place] [address]y [address], [place]      WHOLE BLOOD Low  Hct 40.7 % 41.5-50.4 % Final [facility]: [address] [place], [place] [address]y [address], [place]      WHOLE BLOOD High  Mcv 102.2 fL 80.0-96.0 fL Final [facility]: [address] [place], [place] [address]y [address], [place]      WHOLE BLOOD High  Mch 33.5 pg 27.5-33.2 pg Final [facility]: [address] [place], [place] [address]y [address], [place]      WHOLE BLOOD Low  Mchc 32.8 g/dL 33.4-35.5 g/dL Final [facility]: [address] [place], [place] [address] [address], [place]      WHOLE BLOOD High  Rdw 14.4 % 11.6-13.7 % Final [facility]: [address] [place], [place] [address] [address], [place]      WHOLE BLOOD   Plt 296 x10^3/uL 150-450 x10^3/uL Final [facility]: [facility], [place]      WHOLE BLOOD   Mpv 8.5 fL 7.4-10.4 fL Final [facility]: [facility] [address], [place]      WHOLE BLOOD   Neut # 6.8 x10^3/uL 1.5-7.2 x10^3/uL Final [facility]: [address] [place], [place] [address]y [address], [place]      WHOLE BLOOD   Lymph# 2.2 x10^3/uL 0.7-4.9 x10^3/uL Final [facility]: [facility] [facility] [address], [place]      WHOLE BLOOD High  Mono# 1.1 x10^3/uL 0.1-0.9 x10^3/uL Final [facility]: [address] [place] [address] [address], [place]      WHOLE BLOOD High  Eos # 0.5 x10^3/uL 0.0-0.4 x10^3/uL Final [facility]: [facility] [address], [place]      WHOLE BLOOD   Baso # 0.0 x10^3/uL 0.0-0.2 x10^3/uL Final [facility]: [facility] [address], [place]      WHOLE BLOOD   Neut % 64.4  % 42.2-75.2 % Final [facility]: 1287 [facility] [address], [place]      WHOLE BLOOD High  [place]% 9.9 % 1.7-9.3 % Final [facility]: [address], [place]      WHOLE BLOOD   Eos% 4.4 % 1.0-6.0 % Final [facility]: [address], [place]      WHOLE BLOOD   Baso% 0.5 % 0.0-4.0 % Final [facility]: [address], [place]      WHOLE BLOOD   Lymph % 20.8 % 20.5-51.1 % Final [facility]: [address], [place]    02/15/2017 CMP, Serum or Plasma S   Sodium 140 mmol/L 135-145 mmol/L Final [facility]: [address], [place]      S   Potassium 4.7 mmol/L 3.6-5.2 mmol/L Final [facility]: 1287 [facility] [address], [place]      S   Chloride 106 mmol/L 100-115 mmol/L Final [facility]: [address], [place]      S   Carbon Dioxide 25 mmol/L 21-33 mmol/L Final [facility]: [address], [place]      S   Glucose 91 mg/dL  mg/dL Final [facility]: [address], [place]      S   Bun 23 mg/dL 7-25 mg/dL Final [facility]: [address], [place]      S   Creatinine 1.2 mg/dL 0.6-1.3 mg/dL Final [facility]: [address], [place]      S   BUN/creat Ratio 19.2 calc 10.0-25.0 calc Final [facility]: [address], [place]      S   Calcium 9.2 mg/dL 8.8-10.6 mg/dL Final [facility]: [address], [place]      S   Total Protein 6.7 g/dL 6.4-8.9 g/dL Final [facility]: [address], [place]      S   Albumin 3.8 g/dL 3.5-5.7 g/dL Final [facility]: [address], [place]      S   Globulin 2.9 g/dL 1.3-4.0 g/dL Final [facility]: [address], [place]      S   A/g Ratio 1.3 calc 1.0-2.8 calc Final [facility]: [address], [place]      S   Alk. Phosphatase 74 U/L  U/L Final [facility]: [address], [place]      S   Alt (Sgpt) 21 U/L 7-52 U/L  Final [facility]: 1287 [facility] [address], [place]      S   Ast (Sgot) 24 U/L 13-39 U/L Final [facility]: 1287 [facility] [address], [place]      S   Total Bilirubin 0.38 mg/dL 0.30-1.00 mg/dL Final [facility]: 1287 [facility] [address], [place]      S   Gfr >60.0 >=60.0 Final [facility]: [address] [place] [address] [address], [place]    02/15/2017 Lipid Panel, Serum S   Cholesterol 98 mg/dL  mg/dL Final [facility]: 1287 [facility] [address], [place]      S   Triglycerides 131 mg/dL  mg/dL Final [facility]: 1287 [facility] [address], [place]      S   HDL Cholesterol 30 mg/dL 23-92 mg/dL Final [facility]: [facility] [place] [address] [address], [place]      S Low  LDL Direct 44 mg/dL  mg/dL Final [facility]: [facility] [address], [place]      S   VLDL Cholesterol 26 calc  Final [facility]: [address] [place] [address] [address], [place]      S   HDL Risk Factor 3.3 calc  Final [facility]: [facility] [place] [address] [address], [place]      S   cholesterol/HDL Ratio 3 calc  Final [facility]: [address] [place] [address] Byp, [place]      S   non-HDL Cholesterol 68 calc  Final [facility]: [address] [place] [address] By, [place]    02/15/2017 Uric Acid, Serum or Plasma S   Uric Acid 5.1 mg/dL 4.4-7.6 mg/dL Final [facility]: [address] [place] [address] Byp, [place]    02/15/2017 Testosterone, Total, Serum SERUM- POUR OFF Low  Testosterone, Total, lc/MS/ NG/dL 250-1100 NG/dL Final [facility]: [address] [place] [address] Byp, [place]    02/15/2017 Venipuncture NOT SPECIFIED   Results   Final [facility]: [address] [place] [address] By, [place]    01/05/2016 Cbc    Wbc 9.6 x10^3/uL 4.4-11.0 x10^3/uL Final [facility]: [address], [place]       Low  Rbc 4.13 x10^6/uL 4.50-5.90 x10^6/uL Final [facility] [facility]: [address], [place]         Hgb 14.1 g/dL 14.0-17.2 g/dL Final [facility]: [address] [place], [place]         Hct 41.9 % 41.5-50.4 % Final  [facility]: [address], [place]       High  Mcv 101.4 fL 80.0-96.0 fL Final [facility]: [address], [place]       High  Mch 34.1 pg 27.5-33.2 pg Final [facility]: [address], [place]         Mchc 33.6 g/dL 33.4-35.5 g/dL Final [facility]: [address] [place], [place]       High  Rdw 14.9 % 11.6-13.7 % Final [facility]: [address], [place]         Plt 298 x10^3/uL 150-450 x10^3/uL Final [facility]: [address], [place]         Mpv 8.4 fL 7.4-10.4 fL Final [facility]: [address] [place], [place]         Neut # 6.0 x10^3/uL 1.5-7.2 x10^3/uL Final [facility]: [address], [place]         Lymph# 1.9 x10^3/uL 0.7-4.9 x10^3/uL Final [facility]: [address] [place], [place]       High  [place]# 1.1 x10^3/uL 0.1-0.9 x10^3/uL Final [facility]: [address], [place]       High  Eos # 0.5 x10^3/uL 0.0-0.4 x10^3/uL Final [facility]: [address], [place]         Baso # 0.1 x10^3/uL 0.0-0.2 x10^3/uL Final [facility]: [address], [place]         Neut % 62.5 % 42.2-75.2 % Final [facility]: [address], [place]       High  [place]% 11.6 % 1.7-9.3 % Final [facility]: [address], [place]         Eos% 4.8 % 1.0-6.0 % Final [facility]: [facility] 41 By, [place]         Baso% 1.1 % 0.0-4.0 % Final [facility]: [facility] 41 By, [place]       Low  Lymph % 20.0 % 20.5-51.1 % Final [facility]: [address] [place] [address] 41 By, [place]    01/05/2016 Folate, Serum    Folate 13.9 NG/mL 3.1-17.5 NG/mL Final [facility]: [address] [place] [address] 41 By, [place]    01/05/2016 T4, Free, Serum    FT4 (Free Thyroxine) 1.10 NG/dL 0.82-1.74 NG/dL Final [facility]: [address] [place] [address] 41 By, [place]    01/05/2016 TSH, Serum or Plasma    Tsh 3.490 mIU/mL 0.500-5.000 mIU/mL Final [facility]: [address] [place], [place] [address]y [address], [place]    01/05/2016 Vitamin B12, Serum    Vitamin B-12 916 pg/mL 211-946 pg/mL Final [facility]: [address] [place], [place] [address]y [address], [place]    01/05/2016 T3, Total, Serum    Total T3 1.26 NG/mL 0.70-1.70 NG/mL Final [facility]: [address] [place], [place] [address]y [address], [place]    01/05/2016 T3, Free, Serum or Plasma    T3, Free 2.98 pg/mL 2.00-4.40 pg/mL Final [facility]: [address] [place], [place] [address]y [address], [place]    01/05/2016 Urinalysis, Complete    Color yellow  Final [facility]: [address] [place], [place] [address]y 41 [address], [place]         Appearance clear clear Final [facility]: [address] [place], [place] [address]y [address], [place]         Specific Gravity 1.020  Final [facility]: [address] [place], [place] [address]y [address], [place]         Ph 5.5  Final [facility]: [address] [place], [place] [address]y 41 [address], [place]         Glucose negative negative Final [facility]: [address] [place], [place] [address]y 41 [address], [place]         Bilirubin negative negative Final [facility]: [address] [place], [place] [address]y 41 [address], [place]         Ketone negative negative Final [facility]: [address] [place], [place] [address]y 41 [address], [place]         Blood negative negative Final [facility]: [address] [place], [place] [address]y [address], [place]       ABNORMAL  Protein 2+ negative Final [facility]: [address] [place], [place] [address]y [address], [place]         Nitrite negative negative Final [facility]: [address] [place], [place] [address]y [address], [place]         Leukocytes negative negative Final [facility]: [address] [place], [place] [address]y 41 [address], [place]         Urobilinogen 1.0 E.U./dL  Final [facility]: [address] [place], [place] [address]y [address], [place]    01/05/2016 CMP, Serum or Plasma    Sodium 141 mmol/L 135-145 mmol/L Final [facility]: [address] [place], [place] [address]y [address], [place]         Potassium 4.2 mmol/L 3.6-5.1 mmol/L Final [facility]: [address] [place], [place] [address]y 41 [address], [place]         Chloride 107 mmol/L 100-115 mmol/L Final [facility]: [address], [place]         Carbon Dioxide 25 mmol/L 21-33 mmol/L Final [facility]:  [facility] [address], [place]         Glucose 91 mg/dL  mg/dL Final [facility]: [facility] [facility] [address], [place]         Bun 18 mg/dL 7-25 mg/dL Final [facility]: 1287 [facility] [address], [place]         Creatinine 1.1 mg/dL 0.6-1.3 mg/dL Final [facility]: [facility] [address], [place]         BUN/creat Ratio 16.4 calc 10.0-25.0 calc Final [facility]: [address] [facility] [address], [place]         Calcium 9.3 mg/dL 8.8-10.6 mg/dL Final [facility]: [facility] [address], [place]         Total Protein 6.9 g/dL 6.4-8.9 g/dL Final [facility]: [address] [place] [address] [address], [place]         Albumin 3.7 g/dL 3.5-5.7 g/dL Final [facility]: [address] [place] [address] [address], [place]         Globulin 3.2 g/dL 1.3-4.0 g/dL Final [facility]: [address] [place] [address] [address], [place]         A/g Ratio 1.2 calc 1.0-2.8 calc Final [facility]: [address] [place] [address] [address], [place]         Alk. Phosphatase 78 U/L  U/L Final [facility]: [address] [place] [address] [address], [place]         Alt (Sgpt) 18 U/L 7-52 U/L Final [facility]: [address] [place] [address] 41 [address], [place]         Ast (Sgot) 29 U/L 13-39 U/L Final [facility]: [address] [place] [address] [address], [place]         Total Bilirubin 0.40 mg/dL 0.30-1.00 mg/dL Final [facility]: [address] [place] [address] [address], [place]         Gfr >60.0 >=60.0 Final [facility]: [address] [place] [address] [address], [place]    01/05/2016 Ferritin, Serum or Plasma    Ferritin 44 NG/mL  NG/mL Final [facility]: [address] [place] [address] [place], [place]    01/05/2016 Lipid Panel, Serum    Cholesterol 159 mg/dL  mg/dL Final [facility]: [address] [facility] [address], [place]       High  Triglycerides 239 mg/dL  mg/dL Final [facility]: [address] [facility] [address], [place]         HDL Cholesterol 45 mg/dL 23-92 mg/dL Final [facility]: [address] [facility] [address], [place]         LDL Direct 77 mg/dL  mg/dL Final [facility]: [address] [facility]  [address], [place]         VLDL Cholesterol 48 calc  Final [facility]: [facility] [address], [place]         HDL Risk Factor 3.5 calc  Final [facility]: [address] [facility] [address], [place]         cholesterol/HDL Ratio 4 calc  Final [facility]: [address] [facility] [address], [place]         non-HDL Cholesterol 114 calc  Final [facility]: [facility] [address], [place]    01/05/2016 Uric Acid, Serum or Plasma  Low  Uric Acid 4.3 mg/dL 4.4-7.6 mg/dL Final [facility]: [facility] [address], [place]    01/05/2016 Iron + Total Iron-binding Capacity (TIBC), Serum    Ibct 361 ug/dL 250-450 ug/dL Final [facility]: [facility] [address], [place]         Percent Iron Saturation 18.0 % 13.0-45.0 % Final [facility]: [facility] [address], [place]         Uibc 296  Final [facility]: [address] [place] [address] [address], [place]         Serum Iron 65 ug/dL  ug/dL Final [facility]: [address] [place] [address] [address], [place]    01/05/2016 Venipuncture    Venipuncture charge  Final [facility]: [address] [place] [address] [place] [address], [place]    01/26/2015 Cbc    Wbc 9.6 x10^3/uL 4.4-11.0 x10^3/uL Final [facility]: [facility] [address], [place]         Rbc 4.60 x10^6/uL 4.50-5.90 x10^6/uL Final [facility]: [facility]7 [facility] [address], [place]         Hgb 15.2 g/dL 14.0-17.2 g/dL Final [facility]: [facility] [address], [place]         Hct 45.2 % 41.5-50.4 % Final [facility]: [facility]  [place] [address], [place]       High  Mcv 98.2 fL 80.0-96.0 fL Final [facility]: [facility] [address], [place]         Mch 33.1 pg 27.5-33.2 pg Final [facility]: [address], [place]         Mchc 33.7 g/dL 33.4-35.5 g/dL Final [facility]: [address], [place]       High  Rdw 15.0 % 11.6-13.7 % Final [facility]: [address], [place]         Plt 302 x10^3/uL 150-450 x10^3/uL Final [facility]: [address],  [place]         Mpv 8.3 fL 7.4-10.4 fL Final [facility]: [address], [place]         Neut # 5.6 x10^3/uL 1.5-7.2 x10^3/uL Final [facility]: [address], [place]         Lymph# 2.3 x10^3/uL 0.7-4.9 x10^3/uL Final [facility]: [address], [place]         Mono# 0.9 x10^3/uL 0.1-0.9 x10^3/uL Final [facility]: [address], [place]       High  Eos # 0.6 x10^3/uL 0.0-0.4 x10^3/uL Final [facility]: [address], [place]         [place]o # 0.1 x10^3/uL 0.0-0.2 x10^3/uL Final [facility]: [address], [place]         Neut % 59.0 % 42.2-75.2 % Final [facility]: [address], [place]         [place]% 9.3 % 1.7-9.3 % Final [facility]: [address], [place]         Eos% 6.0 % 1.0-6.0 % Final [facility]: [facility], [place]         Baso% 1.2 % 0.0-4.0 % Final [facility]: [address], [place]         Lymph % 24.5 % 20.5-51.1 % Final [facility]: [address], [place]    01/26/2015 Vitamin B12    Vitamin B-12 442 pg/mL 211-946 pg/mL Final [facility]: [facility], [place]    01/26/2015 PSA Diagnostic    Psa s NG/mL  Final [facility]: [facility] [address], [place]         Psa 2.870 NG/mL 0.000-4.000 NG/mL Final [facility]: [address], [place]    01/26/2015 Folate  High  Folate >20.0 NG/mL 3.1-17.5 NG/mL Final [facility]: 1287 [facility] [address], [place]    01/26/2015 Comprehensive Metabolic Panel    Sodium 138 mmol/L 135-145 mmol/L Final [facility]: [address] [place] [address] [address], [place]         Potassium 4.7 mmol/L 3.6-5.1 mmol/L Final [facility]: 1287 [facility] [address], [place]         Chloride 105 mmol/L 100-115 mmol/L Final [facility]: [address] [place] [address] [address], [place]         Carbon Dioxide 23 mmol/L 21-33 mmol/L Final [facility]: [address] [place] [address] By[place],  [place]         Glucose 100 mg/dL  mg/dL Final [facility]: [facility] [address], [place]         Bun 21 mg/dL 7-25 mg/dL Final [facility]: [facility] [address], [place]         Creatinine 1.2 mg/dL 0.6-1.3 mg/dL Final [facility]: [address] [place] [address] [address], [place]         BUN/creat Ratio 17.5 calc 10.0-25.0 calc Final [facility]: [facility] [address], [place]         Calcium 9.4 mg/dL 8.8-10.6 mg/dL Final [facility]: [address] [place] [address] [address], [place]         Total Protein 6.7 g/dL 6.4-8.9 g/dL Final [facility]: [address] [place] [address] [address], [place]         Albumin 3.8 g/dL 3.5-5.7 g/dL Final [facility]: [address] [place] [address] [address], [place]         Globulin 2.9 g/dL 1.3-4.0 g/dL Final [facility]: [address] [place] [address] [address], [place]         A/g Ratio 1.3 calc 1.0-2.8 calc Final [facility]: [address] [place] [address] [address], [place]         Alk. Phosphatase 62 U/L  U/L Final [facility]: [address] [place] [address] [address], [place]         Alt (Sgpt) 31 U/L 7-52 U/L Final [facility]: [address] [place] [address] [address], [place]         Ast (Sgot) 32 U/L 13-39 U/L Final [facility]: [address] [place] [address] [address], [place]         Total Bilirubin 0.44 mg/dL 0.30-1.00 mg/dL Final [facility]: [address] [place] [address] [address], [place]         Gfr >60.0 >=60.0 Final [facility]: [address] [place] [address] [address], [place]    01/26/2015 Ferritin    Ferritin 48 NG/mL  NG/mL Final [facility]: [address] [place] [address] [address], [place]    01/26/2015 Lipid Panel Direct LDL    Cholesterol 142 mg/dL  mg/dL Final [facility]: [address], [place]         Triglycerides 133 mg/dL  mg/dL Final [facility]: [address], [place]         HDL Cholesterol 42 mg/dL 23-92 mg/dL Final [facility]: [address], [place]         LDL Direct 82 mg/dL  mg/dL Final [facility]: [address], [place]         VLDL  Cholesterol 27 calc  Final [facility]: [facility] [address], [place]         HDL Risk Factor 3.4 calc  Final [facility]: [address]  [place] [address], [place]    01/26/2015 Iron Binding Capacity    Ibct 353 ug/dL 250-450 ug/dL Final [facility]: [address], [place]         Percent Iron Saturation 19.5 % 13.0-45.0 % Final [facility]: [address], [place]         Uibc 284  Final [facility]: [address], [place]         Serum Iron 69 ug/dL  ug/dL Final [facility]: [address], [place]    01/26/2015 Uric Acid    Uric Acid 5.3 mg/dL 4.4-7.6 mg/dL Final [facility]: [address] [place], [place] [address]y [address], [place]    01/26/2015 Iron & Binding Capacity    Ibct 353 ug/dL 250-450 ug/dL Final [facility]: [facility] [address], [place]         Percent Iron Saturation 19.5 % 13.0-45.0 % Final [facility]: [facility] [address], [place]         Uibc 284  Final [facility]: [facility] [address], [place]         Serum Iron 69 ug/dL  ug/dL Final [facility]: [address] [place] [address] [place] [address], [place]    01/26/2015 Venipuncture 1    Venipuncture charge  Final [facility]: [address] [place], [place] [address]y [address], [place]    10/27/2014 Cbc  High  Wbc 11.3 x10^3/uL 4.4-11.0 x10^3/uL Final [facility]: [address] [place], [place] [address]y [address], [place]         Rbc 4.57 x10^6/uL 4.50-5.90 x10^6/uL Final [facility]: [facility] [address], [place]         Hgb 14.2 g/dL 14.0-17.2 g/dL Final [facility]: [facility] [address], [place]         Hct 42.6 % 41.5-50.4 % Final [facility]: [address], [place]         Mcv 93.2 fL 80.0-96.0 fL Final [facility]: [address], [place]         Mch 31.1 pg 27.5-33.2 pg Final [facility]: [address], [place]         Mchc 33.4 g/dL 33.4-35.5 g/dL Final [facility]: [address], [place]       High  Rdw 18.3 % 11.6-13.7 % Final  [facility]: [address], [place]         Plt 363 x10^3/uL 150-450 x10^3/uL Final [facility]: [address], [place]         Mpv 7.4 fL 7.4-10.4 fL Final [facility]: [address], [place]       High  Neut # 7.6 x10^3/uL 1.5-7.2 x10^3/uL Final [facility]: [address], [place]         Lymph# 2.3 x10^3/uL 0.7-4.9 x10^3/uL Final [facility]: [address], [place]       High  [place]# 1.1 x10^3/uL 0.1-0.9 x10^3/uL Final [facility]: [address]  [place] [address], [place]         Eos # 0.2 x10^3/uL 0.0-0.4 x10^3/uL Final [facility]: [address], [place]         [place] # 0.1 x10^3/uL 0.0-0.2 x10^3/uL Final [facility]: [address], [place]         Neut % 67.7 % 42.2-75.2 % Final [facility]: [address], [place]       High  [place]% 9.4 % 1.7-9.3 % Final [facility]: [address], [place]         Eos% 2.0 % 1.0-6.0 % Final [facility]: [address], [place]         [place]o% 0.5 % 0.0-4.0 % Final [facility]: [address], [place]       Low  Lymph % 20.4 % 20.5-51.1 % Final [facility]: [address], [place]    10/27/2014 Basic Metabolic Panel    Sodium 135 mmol/L 135-145 mmol/L Final [facility]: [address], [place]         Potassium 4.9 mmol/L 3.5-5.1 mmol/L Final [facility] [address] [facility] [address], [place]         Chloride 104 mmol/L 100-115 mmol/L Final [facility]: [address] [facility] [address], [place]         Carbon Dioxide 25 mmol/L 21-33 mmol/L Final [facility]: [address] [facility] [address], [place]         Glucose 91 mg/dL  mg/dL Final [facility]: [address] [facility] [address], [place]         Bun 24 mg/dL 7-25 mg/dL Final [facility]: [address] [facility] [address], [place]       High  Creatinine 1.4 mg/dL 0.6-1.3 mg/dL Final [facility] [address] [facility] [address], [place]          BUN/creat Ratio 17.1 calc 10.0-25.0 calc Final [facility]: [address], [place]         Calcium 9.1 mg/dL 8.8-10.6 mg/dL Final [facility]: [address], [place]       Low  Gfr 52.0 >=60.0 Final [facility]: [address], [place]    10/27/2014 Venipuncture 1    Venipuncture charge  Final [facility]: [address], [place]    02/10/2014 Cbc    Wbc 8.7 x10^3/uL 4.4-11.0 x10^3/uL Final [facility]: [address], [place]         Rbc 4.82 x10^6/uL 4.50-5.90 x10^6/uL Final [facility]: [address], [place]         Hgb 14.5 g/dL 14.0-17.2 g/dL Final [facility]: [address], [place]         Hct 44.5 % 41.5-50.4 % Final [facility]: [address], [place]         Mcv 92.3 fL 80.0-96.0 fL Final [facility]: [address], [place]         Mch 30.0 pg 27.5-33.2 pg Final [facility]: [address], [place]       Low  Mchc 32.5 g/dL 33.4-35.5 g/dL Final [facility]: [address] [place], [place]       High  Rdw 16.7 % 11.6-13.7 % Final [facility]: [address], [place]         Plt 287 x10^3/uL 150-450 x10^3/uL Final [facility]: [address] [place], [place]         Mpv 7.8 fL 7.4-10.4 fL Final [facility]: [address] [place], [place]         Neut # 5.0 x10^3/uL 1.5-7.2 x10^3/uL Final [facility]: [address], [place]         Lymph# 2.1 x10^3/uL 0.7-4.9 x10^3/uL Final [facility]: [address] [place], [place]       High  [place]# 1.1 x10^3/uL 0.1-0.9 x10^3/uL Final [facility]: [address], [place]         Eos # 0.4 x10^3/uL 0.0-0.4 x10^3/uL Final [facility]: [address] [place], [place]         [place] # 0.1 x10^3/uL 0.0-0.2 x10^3/uL Final [facility]: [address], [place]         Neut % 57.2 % 42.2-75.2 % Final [facility]: [address]87  [facility] 41 By, [place]       High  [place]% 12.6 % 1.7-9.3 % Final [facility]: [address] [place] [address] By, [place]         Eos% 4.2 % 1.0-6.0 % Final [facility]: [address] [place] [address] By, [place]         Baso% 1.4 % 0.0-4.0 % Final [facility]: [address] [place] [address] By, [place]         Lymph % 24.6 % 20.5-51.1 % Final [facility]: [address] [place] [address] By, [place]    02/10/2014 Folate    Folate 16.3 NG/mL 3.1-17.5 NG/mL Final [facility]: [address] [place] [address] By, [place]    02/10/2014 T4 Free    FT4 (Free Thyroxine) 1.11 NG/dL 0.82-1.74 NG/dL Final [facility]: [address] [place] [address] By, [place]    02/10/2014 Thyroid Stimulating Hormone (TSH)    Tsh 3.700 mIU/mL 0.500-6.000 mIU/mL Final [facility]: [address] [place] [address] By, [place]    02/10/2014 Vitamin B12    Vitamin B-12 577 pg/mL 211-946 pg/mL Final [facility]: [address] [place] [address] By, [place]    02/10/2014 Testosterone, Total    Testosterone 303.7 NG/dL 240.0-847.0 NG/dL Final [facility]: [address] [place] [address] By, [place]    02/10/2014 T3 Total    Total T3 1.31 NG/mL 0.70-1.70 NG/mL Final [facility]: [address] [place] [address] By, [place]    02/10/2014 T3 Free    T3, Free 3.10 pg/mL 2.00-4.40 pg/mL Final [facility]: [address] [place] [address] By, [place]    02/10/2014 Creatine Kinase (CK), Total    Creatine Kinase 62 U/L  U/L Final [facility]: [address] [place] [address] By, [place]    02/10/2014 Comprehensive Metabolic Panel    Sodium 141 mmol/L 135-145 mmol/L Final [facility]: [address], [place]         Potassium 4.6 mmol/L 3.5-5.1 mmol/L Final [facility]: [address] [place], [place]         Chloride 105 mmol/L 100-115 mmol/L Final [facility]: [facility] [address], [place]         Carbon Dioxide 27 mmol/L 21-31 mmol/L Final [facility]: [facility] [address], [place]         Glucose 94 mg/dL  mg/dL Final [facility]: [facility] By[address],  [place]         Bun 16 mg/dL 7-25 mg/dL Final [facility]: [address] [place] [address] 41 By[address], [place]         Creatinine 1.1 mg/dL 0.6-1.3 mg/dL Final [facility]: [address] [place] [address] 41 By[address], [place]         BUN/creat Ratio 14.5 calc 10.0-25.0 calc Final [facility]: [address] [place] [address] 41 [address], [place]         Calcium 9.9 mg/dL 8.8-10.6 mg/dL Final [facility]: [address] [place] [address] 41 [address], [place]         Total Protein 7.1 g/dL 6.4-8.9 g/dL Final [facility]: [address] [place] [address] 41 By[address], [place]         Albumin 4.1 g/dL 3.5-5.7 g/dL Final [facility] Services: [address] [place] [address] [address], [place]         Globulin 3.0 g/dL 1.3-4.0 g/dL Final [facility]: [address] [place] [address] [address], [place]         A/g Ratio 1.4 calc 1.0-2.8 calc Final [facility]: [address] [place] [address] [address], [place]         Alk. Phosphatase 68 U/L  U/L Final [facility]: [address] [place] [address] [address], [place]         Alt (Sgpt) 23 U/L 7-52 U/L Final [facility]: [address] [place] [address] [address], [place]         Ast (Sgot) 31 U/L 13-39 U/L Final [facility]: [address] [place] [address] [address], [place]         Total Bilirubin 0.60 mg/dL 0.30-1.00 mg/dL Final [facility]: [address] [place] [address] [address], [place]         Gfr >60.0 >=60.0 Final [facility]: [address] [place] [address] [address], [place]    02/10/2014 Ferritin    Ferritin 31 NG/mL  NG/mL Final [facility]: [address] [place] [address] [address], [place]    02/10/2014 Iron    Serum Iron 57 ug/dL  ug/dL Final [facility]: [address] [place] [address] [address], [place]    02/10/2014 Lipid Panel Direct LDL    Cholesterol 156 mg/dL  mg/dL Final [facility]: [address], [place]         Triglycerides 144 mg/dL  mg/dL Final [facility]: [address], [place]         HDL Cholesterol 57 mg/dL 23-92 mg/dL Final [facility]: [address] [place], [place]         LDL Direct 75 mg/dL  mg/dL Final [facility]: [address], [place]          VLDL Cholesterol 29 calc  Final [facility]: [address] [facility] [address], [place]         HDL Risk Factor 2.7 calc  Final [facility]: [address] [facility] [address], [place]    02/10/2014 Iron Binding Capacity    Ibct 355 ug/dL 250-450 ug/dL Final [facility]: [address] [facility] [address], [place]         Percent Iron Saturation 16.1 % 13.0-45.0 % Final [facility]: [address] [facility] [address], [place]         Uibc 298  Final [facility]: [address] [facility] [address], [place]         Serum Iron 57 ug/dL  ug/dL Final [facility]: [address] [place] [address] [address], [place]    02/10/2014 Uric Acid    Uric Acid 4.5 mg/dL 4.4-7.6 mg/dL Final [facility]: [address] [place] [address] [address], [place]    02/10/2014 Iron Saturation    Ibct 355 ug/dL 250-450 ug/dL Final [facility]: [address] [facility] [address], [place]         Percent Iron Saturation 16.1 % 13.0-45.0 % Final [facility]: [address] [facility] [address], [place]         Serum Iron 57 ug/dL  ug/dL Final [facility]: [address] [place] [address] [address], [place]    02/10/2014 Venipuncture 1    Venipuncture charge  Final [facility]: [address] [facility] [address], [place]    03/22/2013 Nmr Lipoprofile    No observation recorded.    [facility]: [id number] [facility]    03/22/2013 Nmr Lipoprofile    No observation recorded.        03/20/2013 Nmr Lipoprofile    No observation recorded.        03/18/2013 Comprehensive Metabolic Panel    Sodium 140 mmol/L 135-145 mmol/L Final [facility]: [address] [facility] [address], [place]       High  Potassium 5.2 mmol/L 3.5-5.1 mmol/L Final [facility]: [address] [facility] [address], [place]         Chloride 105 mmol/L 100-115 mmol/L Final [facility]: [address] [facility] [address], [place]         Carbon Dioxide 24 mmol/L 21-31 mmol/L Final [facility]: [address] [place], [place]         Glucose 80 mg/dL  mg/dL Final [facility]: [address], [place]         Bun 20 mg/dL 7-25 mg/dL Final  [facility]: [facility] [address], [place]         Creatinine 1.2 mg/dL 0.6-1.3 mg/dL Final [facility]: [address] [place] [address] [address], [place]         BUN/creat Ratio 16.7 calc 10.0-25.0 calc Final [facility]: [address] [place] [address] [address], [place]         Calcium 9.8 mg/dL 8.6-10.3 mg/dL Final [facility]: [address] [place] [address] [address], [place]         Total Protein 7.2 g/dL 6.4-8.9 g/dL Final [facility]: [address] [place] [address] [address], [place]         Albumin 4.2 g/dL 3.5-5.7 g/dL Final [facility]: [address] [place] [address] [address], [place]         Globulin 3.0 g/dL 1.3-4.0 g/dL Final [facility]: [address] [place] [address] [address], [place]         A/g Ratio 1.4 calc 1.0-2.8 calc Final [facility]: [address] [place] [address] [address], [place]         Alk. Phosphatase 66 U/L  U/L Final [facility]: [address] [place] [address] [address], [place]         Alt (Sgpt) 10 U/L 7-52 U/L Final [facility]: [address] [place] [address] [address], [place]         Ast (Sgot) 19 U/L 13-39 U/L Final [facility]: [address] [place] [address] [address], [place]         Total Bilirubin 0.31 mg/dL 0.30-1.00 mg/dL Final [facility]: [address] [place] [address] [address], [place]         Gfr >60.0 >=60.0 Final [facility]: [address] [place] [address] [address], [place]    03/18/2013 CRP, High Sensitivity    Cardio CRP(R) 0.5 mg/L  Final [facility]: [address] [place] [address] [address], [place]    03/18/2013 Venipuncture 1    Venipuncture charge  Final [facility]: [address] [place] [address] [address], [place]    02/22/2013 Cbc    Wbc 9.4 x10^3/uL 4.4-11.0 x10^3/uL Final [facility]: [address], [place]       Low  Rbc 3.31 x10^6/uL 4.50-5.90 x10^6/uL Final [facility]: [address] [place], [place]       Low  Hgb 11.2 g/dL 14.0-17.2 g/dL Final [facility]: [address], [place]       Low  Hct 33.7 % 41.5-50.4 % Final [facility]: [address], [place]       High  Mcv 101.8 fL 80.0-96.0 fL Final [facility]: [address], [place]       High  Mch 33.8 pg 27.5-33.2 pg Final [facility]: [address], [place]       Low  Mchc 33.2 g/dL 33.4-35.5 g/dL Final [facility]: [address], [place]       High  Rdw 15.1 % 11.6-13.7 % Final [facility]: [address], [place]         Plt 330 x10^3/uL 150-450 x10^3/uL Final [facility]: [address], [place]         Mpv 8.2 fL 7.4-10.4 fL Final [facility]: [address] [place], [place]         Neut # 6.0 x10^3/uL 1.5-7.2 x10^3/uL Final [facility]: [address], [place]         Lymph# 1.7 x10^3/uL 0.7-4.9 x10^3/uL Final [facility]: [address], [place]       High  [place]# 1.0 x10^3/uL 0.1-0.9 x10^3/uL Final [facility]: [address] [place], [place]       High  Eos # 0.6 x10^3/uL 0.0-0.4 x10^3/uL Final [facility]: [address], [place]         [place] # 0.1 x10^3/uL 0.0-0.2 x10^3/uL Final [facility]: [address] [place], [place]         Neut % 63.5 % 42.2-75.2 % Final [facility]: [address], [place]       High  [place]% 10.7 % 1.7-9.3 % Final [facility]: [address], [place]       High  Eos% 6.1 % 1.0-6.0 % Final [facility]: [address] [place], [place]         Baso% 1.6 % 0.0-4.0 % Final [facility]: [address] [facility] [address], [place]       Low  Lymph % 18.1 % 20.5-51.1 % Final [facility]: [address] [place] [address] [address], [place]    02/22/2013 Please Note    Please Note comment  Final [facility]: [address] [place] [address] [address], [place]    02/22/2013 One Specimen Identifier    One Specimen Identifier comment  Final [facility]: [address] [facility] [address], [place]    02/22/2013 Nmr Lipoprofile  High  LDL-P 1182 nmol/L <1000 nmol/L Final [facility]: [address] [facility] [address], [place]         LDL-C 74 mg/dL <100 mg/dL Final [facility]: [address] [place] [address] By, [place]          HDL-C 53 mg/dL >=40 mg/dL Final [facility]: [address] [facility] [address], [place]         Triglycerides 100 mg/dL <150 mg/dL Final [facility]: [address] [facility] [address], [place]         Cholesterol, Total 147 mg/dL <200 mg/dL Final [facility]: [address] [facility] [address], [place]         HDL-P (Total) 35.8 umol/L >= 30.5 umol/L Final [facility]: [address] [facility] [address], [place]       High  Small LDL-P 647 nmol/L <= 527 nmol/L Final [facility]: [address] [place] [address] [address], [place]       Low  LDL Size 20.4 nm > 20.5 nm Final [facility]: [address] [facility] [address], [place]       High  LP-IR Score 72 <= 45 Final [facility]: [address] [place] [address] [address], [place]    02/22/2013 Venipuncture 1    Venipuncture charge  Final [facility]: [address] [place] [address] [address], [place]     Fecal Occult Blood, Stool    Occult Blood Negative negative  In-Office Order: Internal Use Only DO Not Attach Compendium DO Not Attach Compendium DO Not Attach Compendium

## 2020-04-15 ENCOUNTER — VIRTUAL VISIT (OUTPATIENT)
Dept: CARDIOLOGY | Facility: CLINIC | Age: 85
End: 2020-04-15
Attending: INTERNAL MEDICINE
Payer: MEDICARE

## 2020-04-15 DIAGNOSIS — E78.2 MIXED HYPERLIPIDEMIA: ICD-10-CM

## 2020-04-15 DIAGNOSIS — I25.10 CORONARY ARTERY DISEASE INVOLVING NATIVE CORONARY ARTERY OF NATIVE HEART WITHOUT ANGINA PECTORIS: ICD-10-CM

## 2020-04-15 DIAGNOSIS — I10 BENIGN ESSENTIAL HYPERTENSION: ICD-10-CM

## 2020-04-15 PROCEDURE — 99442 ZZC PHYSICIAN TELEPHONE EVALUATION 11-20 MIN: CPT | Performed by: INTERNAL MEDICINE

## 2020-04-15 RX ORDER — PNV NO.95/FERROUS FUM/FOLIC AC 28MG-0.8MG
1 TABLET ORAL
COMMUNITY

## 2020-04-15 NOTE — PROGRESS NOTES
"Du Travis is a 84 year old male who is being evaluated via a billable telephone visit.      The patient has been notified of following:     \"This telephone visit will be conducted via a call between you and your physician/provider. We have found that certain health care needs can be provided without the need for a physical exam.  This service lets us provide the care you need with a short phone conversation.  If a prescription is necessary we can send it directly to your pharmacy.  If lab work is needed we can place an order for that and you can then stop by our lab to have the test done at a later time.    Telephone visits are billed at different rates depending on your insurance coverage. During this emergency period, for some insurers they may be billed the same as an in-person visit.  Please reach out to your insurance provider with any questions.    If during the course of the call the physician/provider feels a telephone visit is not appropriate, you will not be charged for this service.\"    Patient has given verbal consent for Telephone visit?  Yes    How would you like to obtain your AVS? Mail a copy    Review Of Systems  Skin: negative  Eyes: negative  Ears/Nose/Throat: negative  Respiratory: No shortness of breath, dyspnea on exertion, cough, or hemoptysis  Cardiovascular: negative  Gastrointestinal: negative  Genitourinary: negative  Musculoskeletal: negative  Neurologic: negative  Psychiatric: negative  Hematologic/Lymphatic/Immunologic: negative  Endocrine: negative    Patient reported vitals:  BP: 126/70  3 weeks ago  Heart rate: n/a  Weight: 265 3 weeks ago    Shae Clement CMA      Additional provider notes: See dictation    Phone call duration: 20 minutes    Curtis Stoner MD, FACC      "

## 2020-04-15 NOTE — PROGRESS NOTES
Service Date: 04/15/2020      HISTORY OF PRESENT ILLNESS:  It is my pleasure to see your patient, Du Travis who is a very pleasant 84-year-old gentleman.  As you know, this patient has moderate non-flow-limiting coronary artery disease.  He does have a small diffusely diseased second diagonal artery.  The patient also has a history of essential hypertension and mixed hyperlipidemia.  Since I last saw him, he has done well.  He came back from [place] 3 weeks ago.  Fortunately, he did not contract coronavirus as far as he knows.  He has no chest pain, no chest pressure, no unusual shortness of breath.  Blood pressure 3 weeks ago was in the normal range at 126/70.  He had his cholesterol drawn down in [place].  The LDL was 56, HDL was low at 28 and triglycerides were normal at 146.  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  However, his triglycerides are significantly better than they were at this time last year when there were 307.  His HDL, however, as I mentioned, had dropped and was 36 last year.  I suspect that immobility is the issue.  Last year his renal function was abnormal at 1.70.  This has all returned to normal.  The cause of that was unclear.  He has no symptoms of congestive heart failure.  No syncope or near-syncope.      IMPRESSION:   1.  Coronary artery disease.  The patient is asymptomatic with respect to coronary artery disease with no symptoms suggestive of angina pectoris.   2.  Essential hypertension.  His blood pressure is well controlled.   3.  Mixed hyperlipidemia.  His HDL has worsened from previously, most likely due to immobility from bilateral hip bursitis.  He is not able to walk near as much as he could in the past.  However, his LDL is at goal, though higher than it was at this time last year when it was 41.  There has been a significant improvement in triglycerides.     4.  Normal renal function.      PLAN:  I have asked the  patient to reduce his carbohydrate intake and start doing upper body exercises which will also burn up calories and try to increase his HDL and lower triglycerides and improve his LDL cholesterol also.  I will have the patient return to see me in 1 year's time, but as always, he has been told to contact me if he has any questions or any concerns.      cc:   [place] [address]         TAYLOR SENIOR MD, [facility]             D: 04/15/2020   T: 04/15/2020   MT: henko      Name:     KELLY COTO   MRN:      [id number]        Account:      [id number]   :      1935           Service Date: 04/15/2020      Document: [id number]

## 2020-05-26 DIAGNOSIS — I10 BENIGN ESSENTIAL HYPERTENSION: ICD-10-CM

## 2020-05-26 RX ORDER — AMLODIPINE BESYLATE 5 MG/1
5 TABLET ORAL DAILY
Qty: 90 TABLET | Refills: 2 | Status: SHIPPED | OUTPATIENT
Start: 2020-05-26 | End: 2021-02-08

## 2021-02-08 DIAGNOSIS — I10 BENIGN ESSENTIAL HYPERTENSION: ICD-10-CM

## 2021-02-08 RX ORDER — AMLODIPINE BESYLATE 5 MG/1
5 TABLET ORAL DAILY
Qty: 90 TABLET | Refills: 0 | Status: SHIPPED | OUTPATIENT
Start: 2021-02-08 | End: 2021-05-07

## 2021-04-02 DIAGNOSIS — E78.2 MIXED HYPERLIPIDEMIA: Primary | ICD-10-CM

## 2021-04-02 DIAGNOSIS — I25.10 CORONARY ARTERY DISEASE INVOLVING NATIVE CORONARY ARTERY OF NATIVE HEART WITHOUT ANGINA PECTORIS: ICD-10-CM

## 2021-04-06 DIAGNOSIS — E78.2 MIXED HYPERLIPIDEMIA: ICD-10-CM

## 2021-04-06 DIAGNOSIS — I25.10 CORONARY ARTERY DISEASE INVOLVING NATIVE CORONARY ARTERY OF NATIVE HEART WITHOUT ANGINA PECTORIS: ICD-10-CM

## 2021-04-06 LAB
ALT SERPL W P-5'-P-CCNC: 28 U/L (ref 0–70)
CHOLEST SERPL-MCNC: 122 MG/DL
HDLC SERPL-MCNC: 31 MG/DL
LDLC SERPL CALC-MCNC: 51 MG/DL
NONHDLC SERPL-MCNC: 91 MG/DL
TRIGL SERPL-MCNC: 199 MG/DL

## 2021-04-06 PROCEDURE — 80061 LIPID PANEL: CPT | Performed by: INTERNAL MEDICINE

## 2021-04-06 PROCEDURE — 84460 ALANINE AMINO (ALT) (SGPT): CPT | Performed by: INTERNAL MEDICINE

## 2021-04-06 PROCEDURE — 36415 COLL VENOUS BLD VENIPUNCTURE: CPT | Performed by: INTERNAL MEDICINE

## 2021-04-07 ENCOUNTER — OFFICE VISIT (OUTPATIENT)
Dept: CARDIOLOGY | Facility: CLINIC | Age: 86
End: 2021-04-07
Payer: MEDICARE

## 2021-04-07 VITALS
WEIGHT: 266 LBS | BODY MASS INDEX: 33.07 KG/M2 | SYSTOLIC BLOOD PRESSURE: 135 MMHG | HEIGHT: 75 IN | DIASTOLIC BLOOD PRESSURE: 78 MMHG | HEART RATE: 84 BPM

## 2021-04-07 DIAGNOSIS — E78.2 MIXED HYPERLIPIDEMIA: ICD-10-CM

## 2021-04-07 DIAGNOSIS — I25.10 CORONARY ARTERY DISEASE INVOLVING NATIVE CORONARY ARTERY OF NATIVE HEART WITHOUT ANGINA PECTORIS: ICD-10-CM

## 2021-04-07 DIAGNOSIS — I10 BENIGN ESSENTIAL HYPERTENSION: ICD-10-CM

## 2021-04-07 PROCEDURE — 99214 OFFICE O/P EST MOD 30 MIN: CPT | Performed by: INTERNAL MEDICINE

## 2021-04-07 ASSESSMENT — MIFFLIN-ST. JEOR: SCORE: 1979.26

## 2021-04-07 NOTE — PROGRESS NOTES
HPI and Plan:   See dictation    Orders Placed This Encounter   Procedures     Lipid Profile     ALT     Follow-Up with Cardiologist       No orders of the defined types were placed in this encounter.      There are no discontinued medications.      Encounter Diagnoses   Name Primary?     Mixed hyperlipidemia      Benign essential hypertension      Coronary artery disease involving native coronary artery of native heart without angina pectoris        CURRENT MEDICATIONS:  Current Outpatient Medications   Medication Sig Dispense Refill     acetaminophen (TYLENOL) 500 MG tablet Take 500-1,000 mg by mouth 2 times daily       allopurinol (ZYLOPRIM) 300 MG tablet TAKE 1 TABLET BY MOUTH DAILY 90 tablet 3     amLODIPine (NORVASC) 5 MG tablet Take 1 tablet (5 mg) by mouth daily 90 tablet 0     ASPIRIN PO Take 81 mg by mouth daily.       atorvastatin (LIPITOR) 40 MG tablet Take 1 tablet (40 mg) by mouth daily 90 tablet 3     docusate sodium (COLACE) 100 MG capsule Take 100 mg by mouth Monday, Wednesday and Friday       Ferrous Sulfate (IRON) 325 (65 Fe) MG tablet Take 1 tablet by mouth Monday, Wednesday and Friday       fish oil-omega-3 fatty acids 1000 MG capsule Take 2 g by mouth daily       lisinopril (PRINIVIL,ZESTRIL) 20 MG tablet Take 40 mg by mouth daily  1 tablet 0     metoprolol succinate (TOPROL-XL) 100 MG 24 hr tablet Take 1 tablet (100 mg) by mouth daily 90 tablet 0     Multiple Vitamins-Minerals (CENTRUM SILVER) per tablet Take 1 tablet by mouth daily.       PANTOPRAZOLE SODIUM PO Take 40 mg by mouth every morning (before breakfast).       Vitamin D, Cholecalciferol, 1000 UNITS TABS          ALLERGIES     Allergies   Allergen Reactions     Niacin        PAST MEDICAL HISTORY:  Past Medical History:   Diagnosis Date     Arthritis      Atrial fibrillation (H)     found on event monitor 12/26/14     CAD (coronary artery disease)      Erosive gastritis      Gastro-oesophageal reflux disease      Hypertension       Other and unspecified hyperlipidemia      Snores      Syncope October 2014    On flight to [place] October 2014       PAST SURGICAL HISTORY:  Past Surgical History:   Procedure Laterality Date     BIOPSY       CHOLECYSTECTOMY       COLONOSCOPY       CORONARY ANGIOGRAPHY ADULT ORDER  12/2012    diffuse Diag disease, Med management.     ESOPHAGOSCOPY, GASTROSCOPY, DUODENOSCOPY (EGD), COMBINED N/A 7/19/2019    Procedure: ESOPHAGOGASTRODUODENOSCOPY, WITH BIOPSY;  Surgeon: Francesco Martinez MD;  Location:  GI     ORTHOPEDIC SURGERY      bilateral hip replaced, and right knee replaced     ORTHOPEDIC SURGERY      right knee replaced     PHACOEMULSIFICATION CLEAR CORNEA WITH STANDARD INTRAOCULAR LENS IMPLANT  5/21/2013    Procedure: PHACOEMULSIFICATION CLEAR CORNEA WITH STANDARD INTRAOCULAR LENS IMPLANT;  RIGHT PHACOEMULSIFICATION CLEAR CORNEA WITH STANDARD INTRAOCULAR LENS IMPLANT ;  Surgeon: Diana Edward MD;  Location: [facility]     PHACOEMULSIFICATION CLEAR CORNEA WITH STANDARD INTRAOCULAR LENS IMPLANT  5/28/2013    Procedure: PHACOEMULSIFICATION CLEAR CORNEA WITH STANDARD INTRAOCULAR LENS IMPLANT;  LEFT PHACOEMULSIFICATION CLEAR CORNEA WITH STANDARD INTRAOCULAR LENS IMPLANT ;  Surgeon: Diana Edward MD;  Location: [facility]       FAMILY HISTORY:  Family History   Problem Relation Age of Onset     Other - See Comments Mother 94        natural causes     Cancer Father 59       SOCIAL HISTORY:  Social History     Socioeconomic History     Marital status:      Spouse name: None     Number of children: None     Years of education: None     Highest education level: None   Occupational History     None   Social Needs     Financial resource strain: None     Food insecurity     Worry: None     Inability: None     Transportation needs     Medical: None     Non-medical: None   Tobacco Use     Smoking status: Former Smoker     Packs/day: 1.00     Years: 34.00     Pack years: 34.00     Types: Cigars     Start date: 1958  "    Quit date:      Years since quittin.2     Smokeless tobacco: Never Used   Substance and Sexual Activity     Alcohol use: Yes     Alcohol/week: 5.8 standard drinks     Types: 7 Standard drinks or equivalent per week     Comment: daily     Drug use: No     Sexual activity: None   Lifestyle     Physical activity     Days per week: None     Minutes per session: None     Stress: None   Relationships     Social connections     Talks on phone: None     Gets together: None     Attends Mosque service: None     Active member of club or organization: None     Attends meetings of clubs or organizations: None     Relationship status: None     Intimate partner violence     Fear of current or ex partner: None     Emotionally abused: None     Physically abused: None     Forced sexual activity: None   Other Topics Concern     Parent/sibling w/ CABG, MI or angioplasty before 65F 55M? Not Asked      Service Not Asked     Blood Transfusions Not Asked     Caffeine Concern No     Comment: 2 coffee in the morning, cafeine free cola, water-2 large     Occupational Exposure Not Asked     Hobby Hazards Not Asked     Sleep Concern Not Asked     Stress Concern Not Asked     Weight Concern Not Asked     Special Diet Not Asked     Back Care Not Asked     Exercise No     Bike Helmet Not Asked     Seat Belt Yes     Self-Exams Not Asked   Social History Narrative     None       Review of Systems:  Skin:  Negative       Eyes:  Positive for glasses    ENT:  Negative      Respiratory:  Negative       Cardiovascular:  Negative      Gastroenterology: Negative      Genitourinary:  not assessed      Musculoskeletal:  Negative      Neurologic:  Negative      Psychiatric:  Negative      Heme/Lymph/Imm:  Negative      Endocrine:  Negative        Physical Exam:  Vitals: /78   Pulse 84   Ht 1.892 m (6' 2.5\")   Wt 120.7 kg (266 lb)   BMI 33.70 kg/m      Constitutional:  cooperative, alert and oriented, well developed, well " nourished, in no acute distress obese LARGE STOUT GENTLEMAN    Skin:  warm and dry to the touch, no apparent skin lesions or masses noted venous stasis changes        Head:  normocephalic, no masses or lesions        Eyes:  pupils equal and round, conjunctivae and lids unremarkable, sclera white, no xanthalasma, EOMS intact, no nystagmus        Lymph:No Cervical lymphadenopathy present     ENT:  no pallor or cyanosis, dentition good        Neck:  carotid pulses are full and equal bilaterally, JVP normal, no carotid bruit        Respiratory:  normal breath sounds, clear to auscultation, normal A-P diameter, normal symmetry, normal respiratory excursion, no use of accessory muscles         Cardiac: regular rhythm, normal S1/S2, no S3 or S4, apical impulse not displaced, no murmurs, gallops or rubs   distant heart sounds            pulses full and equal, no bruits auscultated                                        GI:    obese      Extremities and Muscular Skeletal:  no deformities, clubbing, cyanosis, erythema observed;no edema         VARICOSE VEINS(Surgical scar over right knee)    Neurological:  no gross motor deficits;affect appropriate        Psych:  Alert and Oriented x 3        CC  Curtis Stoner MD  [id number]  [place] [id number]

## 2021-04-07 NOTE — LETTER
4/7/2021    Kendall Duvall MD  [address] [address]    RE: Du Travis       Dear Colleague,    I had the pleasure of seeing Du Travis in the [facility].    HPI and Plan:   See dictation    Orders Placed This Encounter   Procedures     Lipid Profile     ALT     Follow-Up with Cardiologist       No orders of the defined types were placed in this encounter.      There are no discontinued medications.      Encounter Diagnoses   Name Primary?     Mixed hyperlipidemia      Benign essential hypertension      Coronary artery disease involving native coronary artery of native heart without angina pectoris        CURRENT MEDICATIONS:  Current Outpatient Medications   Medication Sig Dispense Refill     acetaminophen (TYLENOL) 500 MG tablet Take 500-1,000 mg by mouth 2 times daily       allopurinol (ZYLOPRIM) 300 MG tablet TAKE 1 TABLET BY MOUTH DAILY 90 tablet 3     amLODIPine (NORVASC) 5 MG tablet Take 1 tablet (5 mg) by mouth daily 90 tablet 0     ASPIRIN PO Take 81 mg by mouth daily.       atorvastatin (LIPITOR) 40 MG tablet Take 1 tablet (40 mg) by mouth daily 90 tablet 3     docusate sodium (COLACE) 100 MG capsule Take 100 mg by mouth Monday, Wednesday and Friday       Ferrous Sulfate (IRON) 325 (65 Fe) MG tablet Take 1 tablet by mouth Monday, Wednesday and Friday       fish oil-omega-3 fatty acids 1000 MG capsule Take 2 g by mouth daily       lisinopril (PRINIVIL,ZESTRIL) 20 MG tablet Take 40 mg by mouth daily  1 tablet 0     metoprolol succinate (TOPROL-XL) 100 MG 24 hr tablet Take 1 tablet (100 mg) by mouth daily 90 tablet 0     Multiple Vitamins-Minerals (CENTRUM SILVER) per tablet Take 1 tablet by mouth daily.       PANTOPRAZOLE SODIUM PO Take 40 mg by mouth every morning (before breakfast).       Vitamin D, Cholecalciferol, 1000 UNITS TABS          ALLERGIES     Allergies    Allergen Reactions     Niacin        PAST MEDICAL HISTORY:  Past Medical History:   Diagnosis Date     Arthritis      Atrial fibrillation (H)     found on event monitor 12/26/14     CAD (coronary artery disease)      Erosive gastritis      Gastro-oesophageal reflux disease      Hypertension      Other and unspecified hyperlipidemia      Snores      Syncope October 2014    On flight to [place] October 2014       PAST SURGICAL HISTORY:  Past Surgical History:   Procedure Laterality Date     BIOPSY       CHOLECYSTECTOMY       COLONOSCOPY       CORONARY ANGIOGRAPHY ADULT ORDER  12/2012    diffuse Diag disease, Med management.     ESOPHAGOSCOPY, GASTROSCOPY, DUODENOSCOPY (EGD), COMBINED N/A 7/19/2019    Procedure: ESOPHAGOGASTRODUODENOSCOPY, WITH BIOPSY;  Surgeon: Francesco Martinez MD;  Location:  GI     ORTHOPEDIC SURGERY      bilateral hip replaced, and right knee replaced     ORTHOPEDIC SURGERY      right knee replaced     PHACOEMULSIFICATION CLEAR CORNEA WITH STANDARD INTRAOCULAR LENS IMPLANT  5/21/2013    Procedure: PHACOEMULSIFICATION CLEAR CORNEA WITH STANDARD INTRAOCULAR LENS IMPLANT;  RIGHT PHACOEMULSIFICATION CLEAR CORNEA WITH STANDARD INTRAOCULAR LENS IMPLANT ;  Surgeon: Diana Edward MD;  Location: [facility]     PHACOEMULSIFICATION CLEAR CORNEA WITH STANDARD INTRAOCULAR LENS IMPLANT  5/28/2013    Procedure: PHACOEMULSIFICATION CLEAR CORNEA WITH STANDARD INTRAOCULAR LENS IMPLANT;  LEFT PHACOEMULSIFICATION CLEAR CORNEA WITH STANDARD INTRAOCULAR LENS IMPLANT ;  Surgeon: Diana Edward MD;  Location: [facility]       FAMILY HISTORY:  Family History   Problem Relation Age of Onset     Other - See Comments Mother 94        natural causes     Cancer Father 59       SOCIAL HISTORY:  Social History     Socioeconomic History     Marital status:      Spouse name: None     Number of children: None     Years of education: None     Highest education level: None   Occupational History     None   Social Needs      Financial resource strain: None     Food insecurity     Worry: None     Inability: None     Transportation needs     Medical: None     Non-medical: None   Tobacco Use     Smoking status: Former Smoker     Packs/day: 1.00     Years: 34.00     Pack years: 34.00     Types: Cigars     Start date:      Quit date:      Years since quittin.2     Smokeless tobacco: Never Used   Substance and Sexual Activity     Alcohol use: Yes     Alcohol/week: 5.8 standard drinks     Types: 7 Standard drinks or equivalent per week     Comment: daily     Drug use: No     Sexual activity: None   Lifestyle     Physical activity     Days per week: None     Minutes per session: None     Stress: None   Relationships     Social connections     Talks on phone: None     Gets together: None     Attends Islam service: None     Active member of club or organization: None     Attends meetings of clubs or organizations: None     Relationship status: None     Intimate partner violence     Fear of current or ex partner: None     Emotionally abused: None     Physically abused: None     Forced sexual activity: None   Other Topics Concern     Parent/sibling w/ CABG, MI or angioplasty before 65F 55M? Not Asked      Service Not Asked     Blood Transfusions Not Asked     Caffeine Concern No     Comment: 2 coffee in the morning, cafeine free cola, water-2 large     Occupational Exposure Not Asked     Hobby Hazards Not Asked     Sleep Concern Not Asked     Stress Concern Not Asked     Weight Concern Not Asked     Special Diet Not Asked     Back Care Not Asked     Exercise No     Bike Helmet Not Asked     Seat Belt Yes     Self-Exams Not Asked   Social History Narrative     None       Review of Systems:  Skin:  Negative       Eyes:  Positive for glasses    ENT:  Negative      Respiratory:  Negative       Cardiovascular:  Negative      Gastroenterology: Negative      Genitourinary:  not assessed      Musculoskeletal:  Negative     "  Neurologic:  Negative      Psychiatric:  Negative      Heme/Lymph/Imm:  Negative      Endocrine:  Negative        Physical Exam:  Vitals: /78   Pulse 84   Ht 1.892 m (6' 2.5\")   Wt 120.7 kg (266 lb)   BMI 33.70 kg/m      Constitutional:  cooperative, alert and oriented, well developed, well nourished, in no acute distress obese LARGE STOUT GENTLEMAN    Skin:  warm and dry to the touch, no apparent skin lesions or masses noted venous stasis changes        Head:  normocephalic, no masses or lesions        Eyes:  pupils equal and round, conjunctivae and lids unremarkable, sclera white, no xanthalasma, EOMS intact, no nystagmus        Lymph:No Cervical lymphadenopathy present     ENT:  no pallor or cyanosis, dentition good        Neck:  carotid pulses are full and equal bilaterally, JVP normal, no carotid bruit        Respiratory:  normal breath sounds, clear to auscultation, normal A-P diameter, normal symmetry, normal respiratory excursion, no use of accessory muscles         Cardiac: regular rhythm, normal S1/S2, no S3 or S4, apical impulse not displaced, no murmurs, gallops or rubs   distant heart sounds            pulses full and equal, no bruits auscultated                                        GI:    obese      Extremities and Muscular Skeletal:  no deformities, clubbing, cyanosis, erythema observed;no edema         VARICOSE VEINS(Surgical scar over right knee)    Neurological:  no gross motor deficits;affect appropriate        Psych:  Alert and Oriented x 3      Thank you for allowing me to participate in the care of your patient.      Sincerely,     Curtis Stoner MD, MD     [facility]  cc:   Curtis Stoner MD  [address] [address]        "

## 2021-04-07 NOTE — PROGRESS NOTES
Service Date: 2021      HISTORY OF PRESENT ILLNESS:  It is my pleasure to see your patient, Kelly Travis, who was previously seen by my partner, Dr. Chris Luna.  This is a patient with moderate non-flow-limiting coronary artery disease, essential hypertension, mixed hyperlipidemia and moderate obesity.  Since I last saw him, he has been doing well.  He has no chest pains, no chest pressure, no unusual shortness of breath.  His lipid profile, which was drawn yesterday, shows that his LDL is excellent at 51, but he does have HDL deficiency of 31 and hypertriglyceridemia of 199.  His triglycerides are less than they were in 2019 when they were 307, but his HDL has dropped from 36 down to 31.  He weighs 266 pounds with a BMI of 34, indicating moderate obesity.  Two years ago he weighed 269 pounds.  His blood pressure is controlled at 135/78.      His liver function tests are normal with an ALT of 28 indicating no hepatic toxicity.      IMPRESSION:   1.  Coronary artery disease.  The patient is asymptomatic with respect to coronary artery disease with no symptoms suggestive of angina pectoris.   2.  Mixed hyperlipidemia as described above related to his weight and also related to decreased physical activity due social isolation from the COVID-19 pandemic.   3.  Essential hypertension.  His blood pressure is well controlled.      PLAN:  We will continue the patient on his present medications.  We will see the patient back again in 1 year's time, but as always, he has been told to contact us if he has any questions or any concerns.      cc:   Kendall Duvall MD   [address]  [address]         TAYLOR SENIOR MD, FACC             D: 2021   T: 2021   MT: [email or web address]      Name:     KELLY TRAVIS   MRN:      [id number]        Account:      [id number]   :      1935           Service Date: 2021      Document: [id number]

## 2021-05-07 DIAGNOSIS — I10 BENIGN ESSENTIAL HYPERTENSION: ICD-10-CM

## 2021-05-07 RX ORDER — AMLODIPINE BESYLATE 5 MG/1
5 TABLET ORAL DAILY
Qty: 90 TABLET | Refills: 3 | Status: SHIPPED | OUTPATIENT
Start: 2021-05-07